# Patient Record
Sex: FEMALE | Race: WHITE
[De-identification: names, ages, dates, MRNs, and addresses within clinical notes are randomized per-mention and may not be internally consistent; named-entity substitution may affect disease eponyms.]

---

## 2017-03-02 ENCOUNTER — HOSPITAL ENCOUNTER (OUTPATIENT)
Dept: HOSPITAL 75 - RAD | Age: 77
End: 2017-03-02
Attending: NURSE PRACTITIONER
Payer: MEDICARE

## 2017-03-02 DIAGNOSIS — R06.00: Primary | ICD-10-CM

## 2017-03-02 PROCEDURE — 71250 CT THORAX DX C-: CPT

## 2017-03-02 NOTE — DIAGNOSTIC IMAGING REPORT
PROCEDURE: CT chest without contrast.



TECHNIQUE: Multiple contiguous axial images were obtained

through the chest without the use of intravenous contrast.



INDICATION: Shortness of breath with history of pneumonia.



Exam compared 12/26/2016.



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

involves the inferomedial aspects of both right middle lobe and

lingular segment of the left upper lobe at the lung bases

anteriorly. This is a chronic finding. A few scattered foci of

airspace like density in the left upper lobe, right lower lobe

laterally and both posterior sulci present on the previous have

resolved in the interim. An acute infiltrate or adverse change

is not found. No lung mass. No evidence for thoracic adenopathy.

No effusion. No chest wall pathology. Visualized upper abdomen

unremarkable.



IMPRESSION: Chronic changes involve lingula and right middle

lobe stable, multifocal patchy infiltrates present on the

previous exam have resolved. No acute finding or adverse change.



Dictated by:



Dictated on workstation # [id number]

## 2017-03-02 NOTE — XMS REPORT
Continuity of Care Document

 Created on: 2016



BRAN MARTINEZ SR

External Reference #: [id number]

: 1940

Sex: Female



Demographics







 Address  [address] [place]  [address]

 

 Home Phone  [phone]

 

 Preferred Language  English

 

 Marital Status  Unknown

 

 Confucianist Affiliation  Unknown

 

 Race  Unknown

 

 Ethnic Group  Unknown





Author







 Author  [facility]

 

 Organization  [facility]

 

 Address  Unknown

 

 Phone  Unavailable







Support







 Name  Relationship  Address  Phone

 

 AARON, BAYLEE SIMMS  Caregiver  [facility]

 [address] [place], [place]  [address] [phone]

 

 SUGAR WISEMAN MD  Caregiver  [address] [facility] [address]

[place]  [address] [phone]

 

 COURTNEY MCARTHUR MD  Caregiver  [facility]

 [address] [facility] [address]

[place]  [address]  [phone]

 

 ANETTE RIVERA MD  Caregiver  [facility]

 [address] [place], [place]  [address] [phone]

 

 MAGALYS BELL  Next Of Kin  [address] [place]  [address]  [phone]







Care Team Providers







 Care Team Member Name  Role  Phone

 

 SUGAR WISEMAN MD  PCP  [phone]







Insurance Providers







 Payer Name  Policy Number  Subscriber Name  Relationship

 

 [employer]  [id number]  Bran Martinez  18 Self / Same As Patient







Advance Directives







 Directive  Response  Recorded Date/Time

 

 Advance Directives  Yes  16 5:35pm

 

 Health Care Power of   Y SR [facility]   5:35pm

 

 Organ Donor  Yes  16 5:35pm

 

 Resuscitation Status  Full Code  16 5:35pm







Chief Complaint and Reason for Visit







 Chief Complaint  GASTROENTERITIS,WEAKNESS

 

 Reason for Visit  Gastroenteritis

General weakness







Problems

Active Problems







 Medical Problem  Onset Date  Status

 

 Gastroenteritis  Unknown  Acute

 

 General weakness  Unknown  Acute







Medications

Current Home Medications







 Medication  Dose  Units  Route  Directions  Days/Qty  Instructions  Start Date

 

 Levothyroxine Sodium 50 Mcg  50  Mcg  Oral  Daily        12

 

 Mu-Vits-Min Th/Lycopene/Lutein 1 Each  1  Tab  Oral  Daily        12

 

 Omega-3 Fatty Acids/Fish Oil 1 Each  1,000  Mg  Oral  Daily        09/10/12

 

 Cetirizine Hcl 10 Mg  10  Mg  Oral  Daily as needed for Allergies        

 

 Vitamin B Complex 1 Each  1  Cap  Oral  Daily        16

 

 Ascorbate Calcium 500 Mg  500  Mg  Oral  Daily        16

 

 Propylene Glycol 10 Ml  1  Drop  Each Eye  Every 4HRS as needed for Dry Eyes  
      16

 

 Aspirin/Acetaminophen/Caffeine 1 Each  1  Tab  Oral  Twice A Day as needed for 
Pain        16

 

 Cholecalciferol (Vitamin D3) 1,000 Unit  1,000  Unit  Oral  Daily        

 

 Calcium Carbonate/Vitamin D3 1 Each  1  Tab  Oral  Daily        16

 

 Fluticasone Propionate 16 Gm  2  Sprays  Nasal  Daily        16

 

 Cefdinir (Omnicef) 300 Mg  300  Mg  Oral  Twice A Day  10     16

 

 Omeprazole 20 Mg  20  Mg  Oral  Daily  30     16

 

 Azithromycin 500 Mg  500  Mg  Oral  Daily  5     16

 

 Ondansetron 8 Mg  8  Mg  Oral  Every 6 Hours as needed for Nausea  20     





Past Home Medications







 Medication  Directions  Ordered  Status

 

 Albuterol 8.5 Gm Hfa.aer.ad, 8.5 Gm Inhalation  Every 4HRS as needed  12
  Discontinued

 

 Ascorbic Acid 500 Mg Tablet, 500 Mg Oral  Daily  12  Discontinued

 

 [Allergy Shots] ,     12  Discontinued

 

 Doxycycline Hyclate (Vibramycin) 100 Mg Capsule, 1 Each Oral  Twice A Day    Discontinued

 

 Prednisone 50 Mg Tab, 50 Mg Oral  Daily  12  Discontinued

 

 [Allergenie Injection] , 2 Unit Subcutaneously  2SHOTS Every 2 Weeks  09/10/12
  Discontinued

 

 [Vitamin C & D3] 1000 Mg , 1000 Mg Oral  Daily  09/10/12  Discontinued

 

 Calcium Carbonate/Vitamin D3 1 Each Tablet, 500 Mg Oral  Daily  09/10/12  
Discontinued

 

 Tetrahydrozoline Hcl 15 Ml Drops, 1 Drop Ophthalmic  Daily  09/10/12  
Discontinued

 

 Aspirin 81 Mg Tabec, 81 Mg Oral  Daily as needed for Prn  12  
Discontinued

 

 Pantoprazole Sod 40 Mg Tab, 40 Mg Oral  Daily  12  Discontinued

 

 Tiotropium Bromide 18 Mcg Cap.w.dev, 1 Inhaler Inhalation  Daily  12  
Discontinued

 

 Ibuprofen 200 Mg Tablet, 200 Mg Oral  Daily as needed for Pain  16  
Discontinued







Social History







 Social History Problem  Response  Recorded Date/Time

 

 Alcohol Use  Denies Use  2016 5:35pm

 

 Recreational Drug Use  No  2016 5:35pm

 

 Recent Foreign Travel  No  2016 5:35pm

 

 Recent Infectious Disease Exposure  No  2016 5:35pm

 

 Smoking Status  Never a Smoker  2016 5:35pm

 

 Recent Hopitalizations  Y PNEUMONIAS  2016 5:35pm









 Query  Response  Start Date  Stop Date

 

 Smoking Status  Never a Smoker      







Hospital Discharge Instructions

 

Patient Instructions

Physician Instructions

 

New, Converted or Re-Newed RX:  Transmitted to Pharmacy

Goal/Follow Up Appt:  

Dr White next week for release to work

Discharge Diet:  Soft Diet

 

 

Care Plan

Goal:: Dr White next week for release to work

 



Plan of Care







 Discharge Date  16 12:20pm

 

 Disposition  01 HOME, SELF-CARE

 

 Instructions/Education Provided  Nausea and Vomiting, Adult

 

 Forms Provided  [facility] [facility]

 

 Prescriptions  See Medication Section

 

 Referrals   (Unspecified) - 



Reason(s) for Referral:

*********FOLLOW-UP WITH DR. MICHAELS'S OFFICE ON  AT 1:30 P.M.

*********FOLLOW-UP WITH DR. WHITE'S OFFICE ON  AT 2:00 P.M.

MAKE SURE TO BRING INSURANCE CARDS AND PHOTO I.D.

 

 Care Plan and Goals  See Discharge Instructions Section







Functional Status







 Query  Response  Date Recorded

 

 Patient Orientation  Person

Place

Time

Situation

Eyes Open

  2016 7:31am

 

 Comprehension Ability  Understands Concepts

  2016 9:00am







Allergies, Adverse Reactions, Alerts

No known allergies.



Immunizations

No immunization records.



Vital Signs

Acute Vital Signs





 Vital  Response  Date/Time

 

 Temperature (Fahrenheit)  97.2 degrees F (97.6 - 99.5)  2016 11:56am

 

 Temperature (Calculated Celsius)  36.87069 degrees C (36.4 - 37.5)  2016 
8:00am

 

 Temperature Source  Tympanic  2016 11:56am

 

 Pulse Rate (adult)  78 bpm (60 - 90)  2016 11:56am

 

 Respiratory Rate  20 bpm (12 - 24)  2016 11:56am

 

 O2 Sat by Pulse Oximetry  93 % (88 - 100)  2016 11:56am

 

 Blood Pressure  117/76 mm Hg  2016 11:56am

 

    Blood Pressure Mean  90 mm Hg  2016 8:00am

 

 Pain      

 

    Numeric Pain Scale  0-No Pain  2016 11:56am

 

 Height (Feet)  5 feet  2016 5:44pm

 

 Height (Inches)  3.00 inches  2016 5:44pm

 

 Height (Calculated Centimeters)  160.783133 cm  2016 5:44pm

 

 Weight (Pounds)  130 pounds  2016 5:44pm

 

 Weight (Ounces)  4.0 oz  2016 5:44pm

 

 Weight (Calculated Grams)  78475.41 gm  2016 5:44pm

 

 Weight (Calculated Kilograms)  59.304782 kilograms  2016 5:44pm

 

 Calculated BMI  23.1  2016 5:44pm

 

 Capillary Refill      

 

    Capillary Refill  Less Than 3 Seconds  2016 9:00am







Results

Laboratory Results







 Test Name  Result  Units  Flags  Reference  Collection Date/Time  Result Date/
Time  Comments

 

 White Blood Count  6.2  10^3/uL     4.3-11.0  2016 5:00am  2016 6:
14am   

 

 Red Blood Count  4.39  10^6/uL     4.35-5.85  2016 5:00am  2016 6:
14am   

 

 Hemoglobin  14.1  G/DL     11.5-16.0  2016 5:00am  2016 6:14am   

 

 Hematocrit  42  %     35-52  2016 5:00am  2016 6:14am   

 

 Mean Corpuscular Volume  95  FL     80-99  2016 5:00am  2016 6:
14am   

 

 Mean Corpuscular Hemoglobin  32  PG     25-34  2016 5:00am  2016 6:
14am   

 

 Mean Corpuscular Hemoglobin Concent  34  G/DL     32-36  2016 5:00am   6:14am   

 

 Red Cell Distribution Width  12.7  %     10.0-14.5  2016 5:00am  2016 6:14am   

 

 Platelet Count  218  10^3/uL     130-400  2016 5:00am  2016 6:14am
   

 

 Mean Platelet Volume  11.1  FL  H  7.4-10.4  2016 5:00am  2016 6:
14am   

 

 Neutrophils (%) (Auto)  57  %     42-75  2016 5:00am  2016 6:14am 
  

 

 Lymphocytes (%) (Auto)  23  %     12-44  2016 5:00am  2016 6:14am 
  

 

 Monocytes (%) (Auto)  12  %     0-12  2016 5:00am  2016 6:14am   

 

 Eosinophils (%) (Auto)  7  %     0-10  2016 5:00am  2016 6:14am   

 

 Basophils (%) (Auto)  1  %     0-10  2016 5:00am  2016 6:14am   

 

 Neutrophils # (Auto)  3.5  X 10^3     1.8-7.8  2016 5:00am  2016 6:
14am   

 

 Lymphocytes # (Auto)  1.5  X 10^3     1.0-4.0  2016 5:00am  2016 6:
14am   

 

 Monocytes # (Auto)  0.7  X 10^3     0.0-1.0  2016 5:00am  2016 6:
14am   

 

 Eosinophils # (Auto)  0.4  10^3/uL  H  0.0-0.3  2016 5:00am  2016 6
:14am   

 

 Basophils # (Auto)  0.0  10^3/uL     0.0-0.1  2016 5:00am  2016 6:
14am   

 

 Neutrophils % (Manual)  85  %        2016 2:05pm  2016 2:49pm   

 

 Band Neutrophils  4  %        2016 2:05pm  2016 2:49pm   

 

 Lymphocytes % (Manual)  10  %        2016 2:05pm  2016 2:49pm   

 

 Eosinophils % (Manual)  1  %        2016 2:05pm  2016 2:49pm   

 

 Blood Morphology Comment  NORMAL           2016 2:05pm  2016 2:
49pm   

 

 Urine Color  YELLOW           2016 8:15am  2016 8:36am   

 

 Urine Clarity  CLEAR           2016 8:15am  2016 8:36am   

 

 Urine pH  6.5        5-9  2016 8:15am  2016 8:36am   

 

 Urine Specific Gravity  1.005     *  1.016-1.022  2016 8:15am  2016 8:36am   

 

 Urine Protein  NEGATIVE        NEGATIVE  2016 8:15am  2016 8:36am 
  

 

 Urine Glucose (UA)  NEGATIVE        NEGATIVE  2016 8:15am  2016 8:
36am   

 

 Urine RBC (Auto)  NEGATIVE        NEGATIVE  2016 8:15am  2016 8:
36am   

 

 Urine Ketones  NEGATIVE        NEGATIVE  2016 8:15am  2016 8:36am 
  

 

 Urine Nitrite  NEGATIVE        NEGATIVE  2016 8:15am  2016 8:36am 
  

 

 Urine Bilirubin  NEGATIVE        NEGATIVE  2016 8:15am  2016 8:
36am   

 

 Urine Urobilinogen  NORMAL  MG/DL     NORMAL  2016 8:15am  2016 8:
36am   

 

 Urine Leukocyte Esterase  NEGATIVE        NEGATIVE  2016 8:15am  2016 8:36am   

 

 Urine RBC  RARE  /HPF        2016 8:15am  2016 8:36am   

 

 Urine WBC  NONE  /HPF        2016 8:15am  2016 8:36am   

 

 Urine Bacteria  NEGATIVE  /HPF        2016 8:15am  2016 8:36am   

 

 Urine Squamous Epithelial Cells  NONE  /HPF        2016 8:15am  2016 8:36am   

 

 Urine Crystals  NONE  /LPF        2016 8:15am  2016 8:36am   

 

 Urine Casts  NONE  /LPF        2016 8:15am  2016 8:36am   

 

 Urine Mucus  NEGATIVE  /LPF        2016 8:15am  2016 8:36am   

 

 Urine Culture Indicated  NO           2016 8:15am  2016 8:36am   

 

 Sodium Level  136  MMOL/L     135-145  2016 5:00am  2016 7:05am   

 

 Potassium Level  4.4  MMOL/L     3.6-5.0  2016 5:00am  2016 7:05am
   

 

 Chloride Level  103  MMOL/L       2016 5:00am  2016 7:05am   

 

 Carbon Dioxide Level  24  MMOL/L     21-32  2016 5:00am  2016 7:
05am   

 

 Anion Gap  9  MMOL/L     5-14  2016 5:00am  2016 7:05am   

 

 Blood Urea Nitrogen  5  MG/DL  L  7-18  2016 5:00am  2016 7:05am   

 

 Creatinine  0.70  MG/DL     0.60-1.30  2016 5:00am  2016 7:05am   

 

 BUN/Creatinine Ratio  7           2016 5:00am  2016 7:05am   

 

 Estimat Glomerular Filtration Rate  > 60           2016 5:00am  2016 7:05am                    GFR INTERPRETIVE DATA  

  

         UNITS FOR ESTIMATED GFR (eGFR): mL/min/1.73 M2  

         REFERENCE RANGE FOR ESTIMATED GFR (eGFR)  

                                          eGFR  

         NORMAL eGFR                       >60  

         MODERATELY DECREASED eGFR          30-59  

         SEVERLY DECREASED eGFR             15-29  

         KIDNEY FAILURE                    <15 (OR DIALYSIS)  

 

 Glucose Level  88  MG/DL       2016 5:00am  2016 7:05am   

 

 Calcium Level  8.8  MG/DL     8.5-10.1  2016 5:00am  2016 7:05am   

 

 Total Bilirubin  0.3  MG/DL     0.1-1.0  2016 5:00am  2016 7:05am 
  

 

 Alkaline Phosphatase  69  U/L       2016 5:00am  2016 7:05am
   

 

 Aspartate Amino Transf (AST/SGOT)  28  U/L     5-34  2016 5:00am  2016 7:05am   

 

 Alanine Aminotransferase (ALT/SGPT)  21  U/L     0-55  2016 5:00am   7:05am   

 

 B-Type Natriuretic Peptide  55.6  PG/ML     <100.0  2016 9:40am  2016 10:18am   

 

 Total Protein  5.9  G/DL  L  6.4-8.2  2016 5:00am  2016 7:05am   

 

 Albumin  3.7  G/DL     3.2-4.5  2016 5:00am  2016 7:05am   

 

 Lactic Acid Level  1.1  MMOL/L     0.5-2.0  2016 2:05pm  2016 2:
34pm   





Microbiology Results







 Procedure  Source  Result  Collection Date/Time  Result Date/Time

 

 Blood Culture  Peripheral, Right Wrist  No growth  2016 2:05pm  2016 2:09pm

 

 Blood Culture  Peripheral, Lt Hand  No growth  2016 2:00pm  2016 2:
09pm







Procedures

No known history of procedures.



Encounters







 Encounter  Location  Arrival/Admit Date  Discharge/Depart Date  Attending 
Provider

 

 Discharged Inpatient  [facility]  16 5:35pm   12:20pm  ANETTE RIVERA MD











 Recent Diagnosis

 

 Gastroenteritis

 

 General weakness

## 2017-03-28 ENCOUNTER — HOSPITAL ENCOUNTER (OUTPATIENT)
Dept: HOSPITAL 75 - SLEEP | Age: 77
LOS: 1 days | Discharge: HOME | End: 2017-03-29
Attending: NURSE PRACTITIONER
Payer: MEDICARE

## 2017-03-28 DIAGNOSIS — G47.50: ICD-10-CM

## 2017-03-28 DIAGNOSIS — G47.10: Primary | ICD-10-CM

## 2017-03-28 PROCEDURE — 95810 POLYSOM 6/> YRS 4/> PARAM: CPT

## 2017-04-28 ENCOUNTER — HOSPITAL ENCOUNTER (OUTPATIENT)
Dept: HOSPITAL 75 - RT | Age: 77
End: 2017-04-28
Attending: NURSE PRACTITIONER
Payer: MEDICARE

## 2017-04-28 DIAGNOSIS — R06.00: ICD-10-CM

## 2017-04-28 DIAGNOSIS — J47.9: Primary | ICD-10-CM

## 2017-04-28 PROCEDURE — 94729 DIFFUSING CAPACITY: CPT

## 2017-04-28 PROCEDURE — 94640 AIRWAY INHALATION TREATMENT: CPT

## 2017-04-28 PROCEDURE — 94726 PLETHYSMOGRAPHY LUNG VOLUMES: CPT

## 2017-04-28 PROCEDURE — 94060 EVALUATION OF WHEEZING: CPT

## 2018-01-14 ENCOUNTER — HOSPITAL ENCOUNTER (EMERGENCY)
Dept: HOSPITAL 75 - ER | Age: 78
Discharge: HOME | End: 2018-01-14
Payer: MEDICARE

## 2018-01-14 VITALS — SYSTOLIC BLOOD PRESSURE: 156 MMHG | DIASTOLIC BLOOD PRESSURE: 80 MMHG

## 2018-01-14 VITALS — WEIGHT: 131 LBS | BODY MASS INDEX: 23.21 KG/M2 | HEIGHT: 63 IN

## 2018-01-14 VITALS — DIASTOLIC BLOOD PRESSURE: 94 MMHG | SYSTOLIC BLOOD PRESSURE: 163 MMHG

## 2018-01-14 DIAGNOSIS — E03.9: ICD-10-CM

## 2018-01-14 DIAGNOSIS — R55: Primary | ICD-10-CM

## 2018-01-14 DIAGNOSIS — Z90.49: ICD-10-CM

## 2018-01-14 DIAGNOSIS — R91.8: ICD-10-CM

## 2018-01-14 DIAGNOSIS — Z87.01: ICD-10-CM

## 2018-01-14 DIAGNOSIS — Z90.710: ICD-10-CM

## 2018-01-14 DIAGNOSIS — Z90.89: ICD-10-CM

## 2018-01-14 LAB
ALBUMIN SERPL-MCNC: 3.9 GM/DL (ref 3.2–4.5)
ALP SERPL-CCNC: 74 U/L (ref 40–136)
ALT SERPL-CCNC: 17 U/L (ref 0–55)
APTT BLD: 28 SEC (ref 24–35)
APTT PPP: YELLOW S
BACTERIA #/AREA URNS HPF: (no result) /HPF
BASOPHILS # BLD AUTO: 0.1 10^3/UL (ref 0–0.1)
BASOPHILS NFR BLD AUTO: 1 % (ref 0–10)
BILIRUB SERPL-MCNC: 0.3 MG/DL (ref 0.1–1)
BILIRUB UR QL STRIP: NEGATIVE
BUN/CREAT SERPL: 15
CALCIUM SERPL-MCNC: 9.4 MG/DL (ref 8.5–10.1)
CHLORIDE SERPL-SCNC: 106 MMOL/L (ref 98–107)
CO2 SERPL-SCNC: 25 MMOL/L (ref 21–32)
CREAT SERPL-MCNC: 0.79 MG/DL (ref 0.6–1.3)
EOSINOPHIL # BLD AUTO: 0.2 10^3/UL (ref 0–0.3)
EOSINOPHIL NFR BLD AUTO: 4 % (ref 0–10)
ERYTHROCYTE [DISTWIDTH] IN BLOOD BY AUTOMATED COUNT: 12.9 % (ref 10–14.5)
FIBRINOGEN PPP-MCNC: CLEAR MG/DL
GFR SERPLBLD BASED ON 1.73 SQ M-ARVRAT: > 60 ML/MIN
GLUCOSE SERPL-MCNC: 98 MG/DL (ref 70–105)
GLUCOSE UR STRIP-MCNC: NEGATIVE MG/DL
HCT VFR BLD CALC: 41 % (ref 35–52)
HGB BLD-MCNC: 14.1 G/DL (ref 11.5–16)
INR PPP: 1.1 (ref 0.8–1.4)
KETONES UR QL STRIP: NEGATIVE
LEUKOCYTE ESTERASE UR QL STRIP: NEGATIVE
LYMPHOCYTES # BLD AUTO: 1.8 X 10^3 (ref 1–4)
LYMPHOCYTES NFR BLD AUTO: 35 % (ref 12–44)
MAGNESIUM SERPL-MCNC: 2.3 MG/DL (ref 1.8–2.4)
MANUAL DIFFERENTIAL PERFORMED BLD QL: NO
MCH RBC QN AUTO: 32 PG (ref 25–34)
MCHC RBC AUTO-ENTMCNC: 34 G/DL (ref 32–36)
MCV RBC AUTO: 95 FL (ref 80–99)
MONOCYTES # BLD AUTO: 0.4 X 10^3 (ref 0–1)
MONOCYTES NFR BLD AUTO: 8 % (ref 0–12)
MYOGLOBIN SERPL-MCNC: 50.7 NG/ML (ref 10–92)
NEUTROPHILS # BLD AUTO: 2.6 X 10^3 (ref 1.8–7.8)
NEUTROPHILS NFR BLD AUTO: 51 % (ref 42–75)
NITRITE UR QL STRIP: NEGATIVE
PH UR STRIP: 7 [PH] (ref 5–9)
PLATELET # BLD: 265 10^3/UL (ref 130–400)
PMV BLD AUTO: 9.9 FL (ref 7.4–10.4)
POTASSIUM SERPL-SCNC: 4.4 MMOL/L (ref 3.6–5)
PROT SERPL-MCNC: 6.4 GM/DL (ref 6.4–8.2)
PROT UR QL STRIP: NEGATIVE
PROTHROMBIN TIME: 13.9 SEC (ref 12.2–14.7)
RBC # BLD AUTO: 4.35 10^6/UL (ref 4.35–5.85)
RBC #/AREA URNS HPF: (no result) /HPF
SODIUM SERPL-SCNC: 140 MMOL/L (ref 135–145)
SP GR UR STRIP: 1 (ref 1.02–1.02)
T4 FREE SERPL-MCNC: 1.09 NG/DL (ref 0.7–1.48)
UROBILINOGEN UR-MCNC: NORMAL MG/DL
WBC # BLD AUTO: 5.2 10^3/UL (ref 4.3–11)
WBC #/AREA URNS HPF: (no result) /HPF

## 2018-01-14 PROCEDURE — 36415 COLL VENOUS BLD VENIPUNCTURE: CPT

## 2018-01-14 PROCEDURE — 84443 ASSAY THYROID STIM HORMONE: CPT

## 2018-01-14 PROCEDURE — 85730 THROMBOPLASTIN TIME PARTIAL: CPT

## 2018-01-14 PROCEDURE — 93041 RHYTHM ECG TRACING: CPT

## 2018-01-14 PROCEDURE — 85610 PROTHROMBIN TIME: CPT

## 2018-01-14 PROCEDURE — 93005 ELECTROCARDIOGRAM TRACING: CPT

## 2018-01-14 PROCEDURE — 81000 URINALYSIS NONAUTO W/SCOPE: CPT

## 2018-01-14 PROCEDURE — 85025 COMPLETE CBC W/AUTO DIFF WBC: CPT

## 2018-01-14 PROCEDURE — 84439 ASSAY OF FREE THYROXINE: CPT

## 2018-01-14 PROCEDURE — 84484 ASSAY OF TROPONIN QUANT: CPT

## 2018-01-14 PROCEDURE — 80053 COMPREHEN METABOLIC PANEL: CPT

## 2018-01-14 PROCEDURE — 71045 X-RAY EXAM CHEST 1 VIEW: CPT

## 2018-01-14 PROCEDURE — 83874 ASSAY OF MYOGLOBIN: CPT

## 2018-01-14 PROCEDURE — 83735 ASSAY OF MAGNESIUM: CPT

## 2018-01-14 NOTE — XMS REPORT
[facility]

 Created on: 2017



Carie Martinez

External Reference #: [id number]

: 1940

Sex: Female



Demographics







 Address  [address] [place]  [address]

 

 Preferred Language  Unknown

 

 Marital Status  Unknown

 

 Mandaen Affiliation  Unknown

 

 Race  Unknown

 

 Ethnic Group  Unknown





Author







 Author  FESTUS MAN

 

 [facility]

 

 Address  [address] [place]  [address]



 

 Phone  [phone]







Care Team Providers







 Care Team Member Name  Role  Phone

 

 FESTUS MAN  Unavailable  [phone]







PROBLEMS







 Type  Condition  ICD9-CM Code  JMJ00-NT Code  Onset Dates  Condition Status  
SNOMED Code

 

 Problem  Chondromalacia  733.92        Active  88006721

 

 Problem  Acute upper respiratory infections of unspecified site  465.9        
Active  74645349

 

 Problem  Pneumonia, organism unspecified  486        Active  278790356

 

 Problem  ZOSTAVAX DX  V05.8        Active  45530369

 

 Problem  Need for prophylactic vaccination and inoculation, Influenza  V04.81 
       Active  359630951

 

 Problem  Cough  786.2        Active  20416612

 

 Problem  Allergic rhinitis, unspecified allergic rhinitis type     J30.9     
Active  89328202

 

 Problem  Urinary incontinence in female     R32     Active  691692516

 

 Problem  Allergic rhinitis     J30.9     Active  75029127

 

 Problem  Allergic rhinitis, cause unspecified  477.9        Active  83531521

 

 Problem  Hearing difficulty of left ear     H91.92     Active  025920602

 

 Problem  Memory loss     R41.3     Active  45712961







ALLERGIES

Unknown Allergies



SOCIAL HISTORY

No smoking Hx information available



PLAN OF CARE





VITAL SIGNS





MEDICATIONS

Unknown Medications



RESULTS

No Results



PROCEDURES







 Procedure  Date Ordered  Related Diagnosis  Body Site

 

 IMMUNOTHERAPY INJECTIONS  2017      







IMMUNIZATIONS

No Known Immunizations

## 2018-01-14 NOTE — XMS REPORT
[facility]

 Created on: 2017



Carie Martinez

External Reference #: [id number]

: 1940

Sex: Female



Demographics







 Address  [address] [place]  [address]

 

 Preferred Language  Unknown

 

 Marital Status  Unknown

 

 Hinduism Affiliation  Unknown

 

 Race  Unknown

 

 Ethnic Group  Unknown





Author







 Author  MARIAN BUENO

 

 [facility]

 

 Address  [address] [place]  [address]



 

 Phone  [phone]







Care Team Providers







 Care Team Member Name  Role  Phone

 

 MARIAN BUENO  Unavailable  [phone]







PROBLEMS







 Type  Condition  ICD9-CM Code  WZT43-ET Code  Onset Dates  Condition Status  
SNOMED Code

 

 Problem  Acquired hypothyroidism     E03.9     Active  637396068

 

 Problem  Allergic rhinitis, unspecified allergic rhinitis type     J30.9     
Active  96975709

 

 Problem  Urinary incontinence in female     R32     Active  295014054

 

 Problem  Hearing difficulty of left ear     H91.92     Active  128980654







ALLERGIES

No Information



SOCIAL HISTORY

Never Assessed



PLAN OF CARE





VITAL SIGNS





MEDICATIONS

No Known Medications



RESULTS

No Results



PROCEDURES







 Procedure  Date Ordered  Result  Body Site

 

 IMMUNOTHERAPY, 2 OR MORE INJECTIONS  2017  N/A   

 

 IMMUNOTHERAPY INJECTIONS  2017      







IMMUNIZATIONS

No Known Immunizations



MEDICAL (GENERAL) HISTORY







 Type  Description  Date

 

 Medical History  thyroid   

 

 Medical History  allergies   

 

 Medical History  hot flashes   

 

 Medical History  ruptured ovarian cyst   

 

 Surgical History  tonsillectomy   

 

 Surgical History  hysterectomy  

 

 Surgical History  ovarian cyst   

 

 Hospitalization History  surgeries   

 

 Hospitalization History  pneumonia and dehydration  2016

## 2018-01-14 NOTE — XMS REPORT
[facility]

 Created on: 2017



Carie Martinez

External Reference #: [id number]

: 1940

Sex: Female



Demographics







 Address  [address] [place]  [address]

 

 Preferred Language  Unknown

 

 Marital Status  Unknown

 

 Taoist Affiliation  Unknown

 

 Race  Unknown

 

 Ethnic Group  Unknown





Author







 Author  MARIAN BUENO

 

 [facility]

 

 Address  [address] [place]  [address]



 

 Phone  [phone]







Care Team Providers







 Care Team Member Name  Role  Phone

 

 MARIAN BUENO  Unavailable  [phone]







PROBLEMS







 Type  Condition  ICD9-CM Code  GCY95-YQ Code  Onset Dates  Condition Status  
SNOMED Code

 

 Problem  Allergic rhinitis     J30.9     Active  87177819

 

 Problem  Acquired hypothyroidism     E03.9     Active  771256206

 

 Problem  Hearing difficulty of left ear     H91.92     Active  115744804

 

 Problem  Allergic rhinitis, unspecified allergic rhinitis type     J30.9     
Active  12044916

 

 Problem  Urinary incontinence in female     R32     Active  547319830







ALLERGIES

No Information



SOCIAL HISTORY

Never Assessed



PLAN OF CARE





VITAL SIGNS





MEDICATIONS

Unknown Medications



RESULTS

No Results



PROCEDURES







 Procedure  Date Ordered  Result  Body Site

 

 IMMUNOTHERAPY INJECTIONS  May 15, 2017      







IMMUNIZATIONS

No Known Immunizations



MEDICAL (GENERAL) HISTORY







 Type  Description  Date

 

 Medical History  thyroid   

 

 Medical History  allergies   

 

 Medical History  hot flashes   

 

 Medical History  ruptured ovarian cyst   

 

 Surgical History  tonsillectomy   

 

 Surgical History  hysterectomy  

 

 Surgical History  ovarian cyst   

 

 Hospitalization History  surgeries   

 

 Hospitalization History  pneumonia and dehydration  2016

## 2018-01-14 NOTE — DIAGNOSTIC IMAGING REPORT
INDICATION: Cough and congestion.



COMPARISON: 12/25/16



FINDINGS: Single view of the chest demonstrates hilar infiltrates

with basilar atelectasis. There is no pneumothorax. The heart is

prominent without pulmonary edema.



IMPRESSION: Hilar infiltrate with bibasilar atelectasis. Followup

recommended.



Dictated by: 



  Dictated on workstation # [id number]

## 2018-01-14 NOTE — XMS REPORT
[facility]

 Created on: 2017



Carie Martinez

External Reference #: [id number]

: 1940

Sex: Female



Demographics







 Address  [address] [place]  [address]

 

 Preferred Language  Unknown

 

 Marital Status  Unknown

 

 Roman Catholic Affiliation  Unknown

 

 Race  Unknown

 

 Ethnic Group  Unknown





Author







 Author  FESTUS MAN

 

 [facility]

 

 Address  [address] [place]  [address]



 

 Phone  [phone]







Care Team Providers







 Care Team Member Name  Role  Phone

 

 FESTUS MAN  Unavailable  [phone]







PROBLEMS







 Type  Condition  ICD9-CM Code  PJW60-OV Code  Onset Dates  Condition Status  
SNOMED Code

 

 Problem  Chondromalacia  733.92        Active  98739858

 

 Problem  Acute upper respiratory infections of unspecified site  465.9        
Active  92328833

 

 Problem  Pneumonia, organism unspecified  486        Active  288133088

 

 Problem  ZOSTAVAX DX  V05.8        Active  07457583

 

 Problem  Need for prophylactic vaccination and inoculation, Influenza  V04.81 
       Active  186787247

 

 Problem  Cough  786.2        Active  83998222

 

 Problem  Allergic rhinitis, unspecified allergic rhinitis type     J30.9     
Active  18422084

 

 Problem  Urinary incontinence in female     R32     Active  430311258

 

 Problem  Allergic rhinitis     J30.9     Active  99929969

 

 Problem  Allergic rhinitis, cause unspecified  477.9        Active  68592712

 

 Problem  Hearing difficulty of left ear     H91.92     Active  911370053

 

 Problem  Memory loss     R41.3     Active  55389881







ALLERGIES

Unknown Allergies



SOCIAL HISTORY

No smoking Hx information available



PLAN OF CARE





VITAL SIGNS





MEDICATIONS

Unknown Medications



RESULTS

No Results



PROCEDURES







 Procedure  Date Ordered  Related Diagnosis  Body Site

 

 IMMUNOTHERAPY INJECTIONS  2017      







IMMUNIZATIONS

No Known Immunizations

## 2018-01-14 NOTE — XMS REPORT
[facility]

 Created on: 10/03/2017



Carie Martinez

External Reference #: [id number]

: 1940

Sex: Female



Demographics







 Address  [address] [place]  [address]

 

 Preferred Language  Unknown

 

 Marital Status  Unknown

 

 Quaker Affiliation  Unknown

 

 Race  Unknown

 

 Ethnic Group  Unknown





Author







 Author  MARIAN BUENO

 

 [facility]

 

 Address  [address] [place]  [address]



 

 Phone  [phone]







Care Team Providers







 Care Team Member Name  Role  Phone

 

 MARIAN BUENO  Unavailable  [phone]







PROBLEMS







 Type  Condition  ICD9-CM Code  PFW94-WC Code  Onset Dates  Condition Status  
SNOMED Code

 

 Problem  Acquired hypothyroidism     E03.9     Active  509457699

 

 Problem  Allergic rhinitis, unspecified allergic rhinitis type     J30.9     
Active  57125987

 

 Problem  Urinary incontinence in female     R32     Active  005981979

 

 Problem  Hearing difficulty of left ear     H91.92     Active  771697257







ALLERGIES

No Information



SOCIAL HISTORY

Never Assessed



PLAN OF CARE





VITAL SIGNS





MEDICATIONS

No Known Medications



RESULTS

No Results



PROCEDURES







 Procedure  Date Ordered  Result  Body Site

 

 IMMUNOTHERAPY, 2 OR MORE INJECTIONS  2017  N/A   

 

 IMMUNOTHERAPY INJECTIONS  2017      







IMMUNIZATIONS

No Known Immunizations



MEDICAL (GENERAL) HISTORY







 Type  Description  Date

 

 Medical History  thyroid   

 

 Medical History  allergies   

 

 Medical History  hot flashes   

 

 Medical History  ruptured ovarian cyst   

 

 Surgical History  tonsillectomy   

 

 Surgical History  hysterectomy  

 

 Surgical History  ovarian cyst   

 

 Hospitalization History  surgeries   

 

 Hospitalization History  pneumonia and dehydration  2016

## 2018-01-14 NOTE — XMS REPORT
[facility]

 Created on: 2017



Carie Martinez

External Reference #: [id number]

: 1940

Sex: Female



Demographics







 Address  [address] [place]  [address]

 

 Preferred Language  Unknown

 

 Marital Status  Unknown

 

 Latter day Affiliation  Unknown

 

 Race  Unknown

 

 Ethnic Group  Unknown





Author







 Author  GOLD MILLER

 

 [facility]

 

 Address  [address] [place]  [address]



 

 Phone  [phone]







Care Team Providers







 Care Team Member Name  Role  Phone

 

 GOLD MILLER  Unavailable  [phone]







PROBLEMS







 Type  Condition  ICD9-CM Code  SSC86-FK Code  Onset Dates  Condition Status  
SNOMED Code

 

 Problem  Acute upper respiratory infections of unspecified site  465.9        
Active  67417692

 

 Problem  Allergic rhinitis, cause unspecified  477.9        Active  22150728

 

 Problem  ZOSTAVAX DX  V05.8        Active  04041737

 

 Problem  Cough  786.2        Active  81567154

 

 Problem  Need for prophylactic vaccination and inoculation, Influenza  V04.81 
       Active  248671315

 

 Problem  Pneumonia, organism unspecified  486        Active  732687777

 

 Problem  Allergic rhinitis, unspecified allergic rhinitis type     J30.9     
Active  75740023

 

 Problem  Memory loss     R41.3     Active  95389309

 

 Problem  Allergic rhinitis     J30.9     Active  49090662

 

 Problem  Chondromalacia  733.92        Active  43334497

 

 Problem  Hearing difficulty of left ear     H91.92     Active  690432756

 

 Problem  Urinary incontinence in female     R32     Active  810933502







ALLERGIES

Unknown Allergies



SOCIAL HISTORY

No smoking Hx information available



PLAN OF CARE





VITAL SIGNS





MEDICATIONS

Unknown Medications



RESULTS

No Results



PROCEDURES







 Procedure  Date Ordered  Related Diagnosis  Body Site

 

 IMMUNOTHERAPY, 2 OR MORE INJECTIONS  2016  N/A   

 

 IMMUNOTHERAPY INJECTIONS  Dec 19, 2016      







IMMUNIZATIONS

No Known Immunizations

## 2018-01-14 NOTE — XMS REPORT
[facility]

 Created on: 2017



Carie Martinez

External Reference #: [id number]

: 1940

Sex: Female



Demographics







 Address  [address] [place]  [address]

 

 Preferred Language  Unknown

 

 Marital Status  Unknown

 

 Anabaptist Affiliation  Unknown

 

 Race  Unknown

 

 Ethnic Group  Unknown





Author







 Author  FESTUS MAN

 

 [facility]

 

 Address  [address] [place]  [address]



 

 Phone  [phone]







Care Team Providers







 Care Team Member Name  Role  Phone

 

 FESTUS MAN  Unavailable  [phone]







PROBLEMS







 Type  Condition  ICD9-CM Code  IGP00-VP Code  Onset Dates  Condition Status  
SNOMED Code

 

 Problem  Chondromalacia  733.92        Active  46822445

 

 Problem  Acute upper respiratory infections of unspecified site  465.9        
Active  57922593

 

 Problem  Pneumonia, organism unspecified  486        Active  938308837

 

 Problem  ZOSTAVAX DX  V05.8        Active  15684005

 

 Problem  Need for prophylactic vaccination and inoculation, Influenza  V04.81 
       Active  281201533

 

 Problem  Cough  786.2        Active  03617095

 

 Problem  Allergic rhinitis, unspecified allergic rhinitis type     J30.9     
Active  54915848

 

 Problem  Urinary incontinence in female     R32     Active  533607605

 

 Problem  Allergic rhinitis     J30.9     Active  27063297

 

 Problem  Allergic rhinitis, cause unspecified  477.9        Active  97188125

 

 Problem  Hearing difficulty of left ear     H91.92     Active  449638627

 

 Problem  Memory loss     R41.3     Active  10581172







ALLERGIES

Unknown Allergies



SOCIAL HISTORY

No smoking Hx information available



PLAN OF CARE





VITAL SIGNS





MEDICATIONS

Unknown Medications



RESULTS

No Results



PROCEDURES







 Procedure  Date Ordered  Related Diagnosis  Body Site

 

 IMMUNOTHERAPY, 2 OR MORE INJECTIONS  2017  N/A   

 

 IMMUNOTHERAPY INJECTIONS  2017      







IMMUNIZATIONS

No Known Immunizations

## 2018-01-14 NOTE — XMS REPORT
[facility]

 Created on: 2017



Carie Martinez

External Reference #: [id number]

: 1940

Sex: Female



Demographics







 Address  [address] [place]  [address]

 

 Preferred Language  Unknown

 

 Marital Status  Unknown

 

 Moravian Affiliation  Unknown

 

 Race  Unknown

 

 Ethnic Group  Unknown





Author







 Author  FESTUS MAN

 

 [facility]

 

 Address  [address] [place]  [address]



 

 Phone  [phone]







Care Team Providers







 Care Team Member Name  Role  Phone

 

 FESTUS MAN  Unavailable  [phone]







PROBLEMS







 Type  Condition  ICD9-CM Code  CDO60-YG Code  Onset Dates  Condition Status  
SNOMED Code

 

 Problem  Chondromalacia  733.92        Active  90806001

 

 Problem  Acute upper respiratory infections of unspecified site  465.9        
Active  98563720

 

 Problem  Pneumonia, organism unspecified  486        Active  209124256

 

 Problem  ZOSTAVAX DX  V05.8        Active  21756734

 

 Problem  Need for prophylactic vaccination and inoculation, Influenza  V04.81 
       Active  977872564

 

 Problem  Cough  786.2        Active  67123110

 

 Problem  Allergic rhinitis, unspecified allergic rhinitis type     J30.9     
Active  56646835

 

 Problem  Urinary incontinence in female     R32     Active  507676135

 

 Problem  Allergic rhinitis     J30.9     Active  47646355

 

 Problem  Allergic rhinitis, cause unspecified  477.9        Active  72755177

 

 Problem  Hearing difficulty of left ear     H91.92     Active  501476380

 

 Problem  Memory loss     R41.3     Active  84750859







ALLERGIES

Unknown Allergies



SOCIAL HISTORY

No smoking Hx information available



PLAN OF CARE





VITAL SIGNS





MEDICATIONS

Unknown Medications



RESULTS

No Results



PROCEDURES

No Known procedures



IMMUNIZATIONS

No Known Immunizations

## 2018-01-14 NOTE — XMS REPORT
[facility]

 Created on: 2017



Carie Martinez

External Reference #: [id number]

: 1940

Sex: Female



Demographics







 Address  [address] [place]  [address]

 

 Preferred Language  Unknown

 

 Marital Status  Unknown

 

 Roman Catholic Affiliation  Unknown

 

 Race  Unknown

 

 Ethnic Group  Unknown





Author







 Author  FESTUS MAN

 

 [facility]

 

 Address  [address] [place]  [address]



 

 Phone  [phone]







Care Team Providers







 Care Team Member Name  Role  Phone

 

 FESTUS MAN  Unavailable  [phone]







PROBLEMS







 Type  Condition  ICD9-CM Code  TSX58-VV Code  Onset Dates  Condition Status  
SNOMED Code

 

 Problem  Chondromalacia  733.92        Active  31119436

 

 Problem  Acute upper respiratory infections of unspecified site  465.9        
Active  29894245

 

 Problem  Pneumonia, organism unspecified  486        Active  925412301

 

 Problem  ZOSTAVAX DX  V05.8        Active  54284432

 

 Problem  Need for prophylactic vaccination and inoculation, Influenza  V04.81 
       Active  382720741

 

 Problem  Cough  786.2        Active  30350745

 

 Problem  Allergic rhinitis, unspecified allergic rhinitis type     J30.9     
Active  58790776

 

 Problem  Urinary incontinence in female     R32     Active  653744245

 

 Problem  Allergic rhinitis     J30.9     Active  55192538

 

 Problem  Allergic rhinitis, cause unspecified  477.9        Active  27318668

 

 Problem  Hearing difficulty of left ear     H91.92     Active  539502878

 

 Problem  Memory loss     R41.3     Active  03648223







ALLERGIES

Unknown Allergies



SOCIAL HISTORY

No smoking Hx information available



PLAN OF CARE





VITAL SIGNS





MEDICATIONS

Unknown Medications



RESULTS

No Results



PROCEDURES







 Procedure  Date Ordered  Related Diagnosis  Body Site

 

 IMMUNOTHERAPY INJECTIONS  2017      







IMMUNIZATIONS

No Known Immunizations

## 2018-01-14 NOTE — XMS REPORT
[facility]

 Created on: 2017



Carie Martinez

External Reference #: [id number]

: 1940

Sex: Female



Demographics







 Address  [address] [place]  [address]

 

 Preferred Language  Unknown

 

 Marital Status  Unknown

 

 Yazidi Affiliation  Unknown

 

 Race  Unknown

 

 Ethnic Group  Unknown





Author







 Author  GOLD MILLER

 

 [facility]

 

 Address  [address] [place]  [address]



 

 Phone  [phone]







Care Team Providers







 Care Team Member Name  Role  Phone

 

 GOLD MILLER  Unavailable  [phone]







PROBLEMS







 Type  Condition  ICD9-CM Code  QZB10-JJ Code  Onset Dates  Condition Status  
SNOMED Code

 

 Assessment  Allergic rhinitis     J30.9  12 Dec, 2016  Active  25613190

 

 Problem  Need for prophylactic vaccination and inoculation, Influenza  V04.81 
       Active  038628662

 

 Problem  Cough  786.2        Active  70608644

 

 Problem  Allergic rhinitis     J30.9     Active  52904323

 

 Problem  Chondromalacia  733.92        Active  63348029

 

 Problem  Acute upper respiratory infections of unspecified site  465.9        
Active  54556067

 

 Problem  Pneumonia, organism unspecified  486        Active  683737577

 

 Problem  Allergic rhinitis, cause unspecified  477.9        Active  20238378

 

 Problem  ZOSTAVAX DX  V05.8        Active  63233296







ALLERGIES

Unknown Allergies



SOCIAL HISTORY

No smoking Hx information available



PLAN OF CARE





VITAL SIGNS





MEDICATIONS

Unknown Medications



RESULTS

No Results



PROCEDURES







 Procedure  Date Ordered  Related Diagnosis  Body Site

 

 IMMUNOTHERAPY, 2 OR MORE INJECTIONS  2016  N/A   

 

 IMMUNOTHERAPY INJECTIONS  Dec 12, 2016      







IMMUNIZATIONS

No Known Immunizations

## 2018-01-14 NOTE — XMS REPORT
[facility]

 Created on: 10/06/2017



Carie Martinez

External Reference #: [id number]

: 1940

Sex: Female



Demographics







 Address  [address] [place]  [address]

 

 Preferred Language  Unknown

 

 Marital Status  Unknown

 

 Denominational Affiliation  Unknown

 

 Race  Unknown

 

 Ethnic Group  Unknown





Author







 Author  MARIAN BUENO

 

 [facility]

 

 Address  [address] [place]  [address]



 

 Phone  [phone]







Care Team Providers







 Care Team Member Name  Role  Phone

 

 MARIAN BUENO  Unavailable  [phone]







PROBLEMS







 Type  Condition  ICD9-CM Code  FVY57-YJ Code  Onset Dates  Condition Status  
SNOMED Code

 

 Problem  Acquired hypothyroidism     E03.9     Active  883822318

 

 Problem  Allergic rhinitis, unspecified allergic rhinitis type     J30.9     
Active  26063653

 

 Problem  Urinary incontinence in female     R32     Active  750951850

 

 Problem  Hearing difficulty of left ear     H91.92     Active  112274477







ALLERGIES

No Information



SOCIAL HISTORY

Never Assessed



PLAN OF CARE





VITAL SIGNS





MEDICATIONS

No Known Medications



RESULTS

No Results



PROCEDURES







 Procedure  Date Ordered  Result  Body Site

 

 IMMUNOTHERAPY, 2 OR MORE INJECTIONS  2017  N/A   

 

 IMMUNOTHERAPY INJECTIONS  2017      







IMMUNIZATIONS

No Known Immunizations



MEDICAL (GENERAL) HISTORY







 Type  Description  Date

 

 Medical History  thyroid   

 

 Medical History  allergies   

 

 Medical History  hot flashes   

 

 Medical History  ruptured ovarian cyst   

 

 Surgical History  tonsillectomy   

 

 Surgical History  hysterectomy  

 

 Surgical History  ovarian cyst   

 

 Hospitalization History  surgeries   

 

 Hospitalization History  pneumonia and dehydration  2016

## 2018-01-14 NOTE — XMS REPORT
[facility]

 Created on: 2017



Carie Martinez

External Reference #: [id number]

: 1940

Sex: Female



Demographics







 Address  [address] [place]  [address]

 

 Preferred Language  Unknown

 

 Marital Status  Unknown

 

 Synagogue Affiliation  Unknown

 

 Race  Unknown

 

 Ethnic Group  Unknown





Author







 Author  CLARITZA LIN

 

 Organization  [facility]

 

 Address  [address] [place]  [address]



 

 Phone  [phone]







Care Team Providers







 Care Team Member Name  Role  Phone

 

 CLARITZA LIN  Unavailable  [phone]







PROBLEMS







 Type  Condition  ICD9-CM Code  EKT00-XW Code  Onset Dates  Condition Status  
SNOMED Code

 

 Problem  Chondromalacia  733.92        Active  38516385

 

 Problem  Acute upper respiratory infections of unspecified site  465.9        
Active  27633544

 

 Problem  Pneumonia, organism unspecified  486        Active  050551477

 

 Problem  ZOSTAVAX DX  V05.8        Active  32351705

 

 Problem  Need for prophylactic vaccination and inoculation, Influenza  V04.81 
       Active  053203709

 

 Problem  Cough  786.2        Active  88673785

 

 Problem  Allergic rhinitis, unspecified allergic rhinitis type     J30.9     
Active  95267463

 

 Problem  Urinary incontinence in female     R32     Active  878473368

 

 Problem  Allergic rhinitis     J30.9     Active  47873064

 

 Problem  Allergic rhinitis, cause unspecified  477.9        Active  19769873

 

 Problem  Hearing difficulty of left ear     H91.92     Active  604843041

 

 Problem  Memory loss     R41.3     Active  42237757







ALLERGIES







 Substance  Reaction  Event Type  Date  Status

 

 cat/dog dander/tree/dust  Unknown  Non Drug Allergy    Active







SOCIAL HISTORY

No smoking Hx information available



PLAN OF CARE







 Activity  Details









  









 Follow Up  prn Reason:







VITAL SIGNS







 Height  63 in  2017

 

 Weight  128.0 lbs  2017

 

 Temperature  97.9 degrees Fahrenheit  2017

 

 Heart Rate  78 bpm  2017

 

 Respiratory Rate  20   2017

 

 Oximetry  97 %  2017

 

 BMI  22.67 kg/m2  2017

 

 Blood pressure systolic  124 mmHg  2017

 

 Blood pressure diastolic  78 mmHg  2017







MEDICATIONS







 Medication  Instructions  Dosage  Frequency  Start Date  End Date  Duration  
Status

 

 Meloxicam 7.5 MG  Orally Once a day  1 tablet  24h  
  5 days  Active

 

 PredniSONE 20 MG  Orally Once a day  2 tablet  24h  
  5 days  Active

 

 levothyroxine 25 mcg     take 1 tablet (25 mcg) by oral route once daily     
21 Aug, 2012        Active

 

 Fish Oil 1000 MG  Orally Once a day  1 capsule  24h           Active

 

 Centrum Silver           21 Aug, 2012        Active

 

 Flonase Allergy Relief 50 MCG/ACT  Nasally Once a day  1 spray in each nostril
  24h           Active

 

 Tessalon Perles 100 mg     1 capsule by Oral route 3 times per day PRN     10 
Mar, 2014        Active

 

 Omeprazole 20 MG  Orally Once a day  2 capsules  24h           Active

 

 Vitamin B Complex                    Active

 

 Vitamin C 500 MG                    Active







RESULTS







 Name  Result  Date  Reference Range

 

 Xray : Chest (IN HOUSE)     2017   







PROCEDURES







 Procedure  Date Ordered  Related Diagnosis  Body Site

 

 MEASURE BLOOD OXYGEN LEVEL  2017      

 

 CHEST X-RAY  2017      

 

 Office Visit, Est Pt., Level 3  2017      

 

 [facility] VISIT ESTABLISHED PATIENT  2017      







IMMUNIZATIONS

No Known Immunizations

## 2018-01-14 NOTE — XMS REPORT
[facility]

 Created on: 2015



Carie Martinez

External Reference #: [id number]

: 1940

Sex: Female



Demographics







 Address  [address] [place]  [address]

 

 Home Phone  [phone]

 

 Preferred Language  Unknown

 

 Marital Status  Unknown

 

 Protestant Affiliation  Unknown

 

 Race  White

 

 Ethnic Group  Not  or 





Author







 Author  TRACEY SANFORD

 

 [facility]  [employer]

 

 Address  Unknown

 

 Phone  Unavailable







Care Team Providers







 Care Team Member Name  Role  Phone

 

 TRACEY SANFORD  CP  Unavailable



                                                                



Allergies, Adverse Reactions, Alerts

          





 Substance  Reaction  Event Type

 

 N.K.D.A.  Info Not Available  Non Drug Allergy



                                                                               
         



Problems

          





 Problem Type  Condition  Code  Onset Dates  Condition Status

 

 Assessment  Allergic rhinitis, unspecified allergic rhinitis type  J30.9     
Active

 

 Assessment  Encounter for immunization  Z23     Active

 

 Problem  Allergic rhinitis, cause unspecified  477.9     Active

 

 Problem  ZOSTAVAX DX  V05.8     Active

 

 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

 

 Problem  Need for prophylactic vaccination and inoculation, Influenza  V04.81 
    Active

 

 Problem  Cough  786.2     Active

 

 Problem  Acute upper respiratory infections of unspecified site  465.9     
Active

 

 Problem  Pneumonia, organism unspecified  486     Active



                                                                               
                                                                               
          



Medications

          





 Medication  Code System  Code  Instructions  Start Date  End Date  Status  
Dosage

 

 Centrum Silver  NDC  [address]-55      Aug 21, 2012        not defined

 

 Fish Oil  NDC  [address]-33  1000 MG Orally Once a day           1 capsule

 

 levothyroxine  NDC  0  25 mcg    Aug 21, 2012        take 1 tablet (25 mcg) by 
oral route once daily



                                                                               
                             



Procedures

          





 Procedure  Coding System  Code  Date

 

 Office Visit, Est Pt., Level 3  CPT-4  88881  2015

 

 PCV 13  CPT-4  21363  2015

 

 [facility] VISIT ESTABLISHED PATIENT  CPT-4    2015

 

 SINGLE IMMUNIZATION ADMIN  CPT-4  57117  2015



                                                                               
                                                 



Vital Signs

          





 Date/Time:  2015

 

 Temperature  96.1 F

 

 Weight  133.9 lbs

 

 Height  63 in

 

 BMI  23.72 Index

 

 Blood Pressure Diastolic  80 mmHg

 

 Blood Pressure Systolic  120 mmHg

 

 Cardiac Monitoring Heart Rate  88 bpm



                                                                              



Results

          No Known Results                                                     
                                   



Immunizations

          





 Vaccine  Administration Date

 

 PCV 13  2015



                                                                    



Summary Purpose

          eClinicalWorks Submission

## 2018-01-14 NOTE — XMS REPORT
[facility]

 Created on: 10/11/2016



Carie Martinez

External Reference #: [id number]

: 1940

Sex: Female



Demographics







 Address  [address] [place]  [address]

 

 Home Phone  [phone]

 

 Preferred Language  Unknown

 

 Marital Status  Unknown

 

 Mormonism Affiliation  Unknown

 

 Race  White

 

 Ethnic Group  Not  or 





Author







 Author  NORBERTO ANNA

 

 [facility]  [employer]

 

 Address  Unknown

 

 Phone  Unavailable







Care Team Providers







 Care Team Member Name  Role  Phone

 

 NORBERTO ANNA  CP  Unavailable



                                                                



Allergies, Adverse Reactions, Alerts

          





 Substance  Reaction  Event Type

 

 N.K.D.A.  Info Not Available  Non Drug Allergy



                                                                               
         



Problems

          





 Problem Type  Condition  Code  Onset Dates  Condition Status

 

 Assessment  Upper respiratory tract infection, unspecified type  J06.9     
Active

 

 Assessment  Bilateral otitis media with effusion  H65.93     Active

 

 Problem  Allergic rhinitis, cause unspecified  477.9     Active

 

 Problem  ZOSTAVAX DX  V05.8     Active

 

 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

 

 Problem  Need for prophylactic vaccination and inoculation, Influenza  V04.81 
    Active

 

 Problem  Cough  786.2     Active

 

 Problem  Acute upper respiratory infections of unspecified site  465.9     
Active

 

 Problem  Pneumonia, organism unspecified  486     Active



                                                                               
                                                                               
          



Medications

          





 Medication  Code System  Code  Instructions  Start Date  End Date  Status  
Dosage

 

 Vitamin B Complex  NDC  [address]959-[address]   Orally            not defined

 

 Fish Oil  NDC  [address]-00  1000 MG Orally Once a day           1 capsule

 

 levothyroxine  NDC  0  25 mcg    Aug 21, 2012        take 1 tablet (25 mcg) by 
oral route once daily

 

 Vitamin C  NDC  50903-224[address]  500 MG Orally            not defined

 

 PredniSONE  NDC  [address]-67  20 MG Orally Once a day  Oct 07, 2016  Oct 12, 
2016     2 tablet

 

 Zyrtec Allergy  NDC  [address]-79  10 MG Orally Once a day           1 tablet 
as needed

 

 Centrum Silver  NDC  [address]-79      Aug 21, 2012        not defined

 

 Azithromycin  NDC  [address]-71  250 MG Orally Once a day  Oct 07, 2016  Oct 
12, 2016     2 tablets  on the first day, then 1 tablet daily for 4 days



                                                                               
                                                                               



Procedures

          





 Procedure  Coding System  Code  Date

 

 Office Visit, Est Pt., Level 3  CPT-4  43906  Oct 07, 2016

 

 [facility] VISIT ESTABLISHED PATIENT  CPT-4    Oct 07, 2016



                                                                               
                             



Vital Signs

          





 Date/Time:  Oct 07, 2016

 

 Cardiac Monitoring Heart Rate  80 bpm

 

 Weight  129.2 lbs

 

 Height  63 in

 

 BMI  22.88 Index

 

 Blood Pressure Diastolic  74 mmHg

 

 Blood Pressure Systolic  128 mmHg



                                                                              



Results

          No Known Results                                                     
               



Summary Purpose

          eClinicalWorks Submission

## 2018-01-14 NOTE — XMS REPORT
[facility]

 Created on: 2017



Carie Martinez

External Reference #: [id number]

: 1940

Sex: Female



Demographics







 Address  [address] [place]  [address]

 

 Preferred Language  Unknown

 

 Marital Status  Unknown

 

 Episcopalian Affiliation  Unknown

 

 Race  Unknown

 

 Ethnic Group  Unknown





Author







 Author  MARIAN BUENO

 

 [facility]

 

 Address  [address] [place]  [address]



 

 Phone  [phone]







Care Team Providers







 Care Team Member Name  Role  Phone

 

 MARIAN BUENO  Unavailable  [phone]







PROBLEMS







 Type  Condition  ICD9-CM Code  VNW23-YY Code  Onset Dates  Condition Status  
SNOMED Code

 

 Problem  Allergic rhinitis     J30.9     Active  00431624

 

 Problem  Acquired hypothyroidism     E03.9     Active  412049394

 

 Problem  Hearing difficulty of left ear     H91.92     Active  900789779

 

 Problem  Allergic rhinitis, unspecified allergic rhinitis type     J30.9     
Active  91589814

 

 Problem  Urinary incontinence in female     R32     Active  000609228







ALLERGIES

No Information



SOCIAL HISTORY

Never Assessed



PLAN OF CARE





VITAL SIGNS





MEDICATIONS

Unknown Medications



RESULTS

No Results



PROCEDURES







 Procedure  Date Ordered  Result  Body Site

 

 IMMUNOTHERAPY INJECTIONS  2017      







IMMUNIZATIONS

No Known Immunizations



MEDICAL (GENERAL) HISTORY







 Type  Description  Date

 

 Medical History  thyroid   

 

 Medical History  allergies   

 

 Medical History  hot flashes   

 

 Medical History  ruptured ovarian cyst   

 

 Surgical History  tonsillectomy   

 

 Surgical History  hysterectomy  

 

 Surgical History  ovarian cyst   

 

 Hospitalization History  surgeries   

 

 Hospitalization History  pneumonia and dehydration  2016

## 2018-01-14 NOTE — XMS REPORT
[facility]

 Created on: 10/26/2015



Carie Martinez

External Reference #: [id number]

: 1940

Sex: Female



Demographics







 Address  [address] [address]

 

 Home Phone  [phone]

 

 Preferred Language  Unknown

 

 Marital Status  Unknown

 

 Gnosticist Affiliation  Unknown

 

 Race  White

 

 Ethnic Group  Not  or 





Author







 FESTUS Masterson  [employer]

 

 Address  Unknown

 

 Phone  Unavailable







Care Team Providers







 Care Team Member Name  Role  Phone

 

 FESTUS MAN  CP  Unavailable



                                                                



Allergies

          No Known Allergies                                                   
                                     



Problems

          





 Problem Type  Condition  Code  Onset Dates  Condition Status

 

 Assessment  Encounter for immunization  Z23     Active

 

 Problem  Allergic rhinitis, cause unspecified  477.9     Active

 

 Problem  ZOSTAVAX DX  V05.8     Active

 

 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

 

 Problem  Need for prophylactic vaccination and inoculation, Influenza  V04.81 
    Active

 

 Problem  Cough  786.2     Active

 

 Problem  Acute upper respiratory infections of unspecified site  465.9     
Active

 

 Problem  Pneumonia, organism unspecified  486     Active



                                                                               
                                                                               



Medications

          No Known Medications                                                 
                                       



Procedures

          





 Procedure  Coding System  Code  Date

 

 SINGLE IMMUNIZATION ADMIN  CPT-4  35209  Oct 26, 2015

 

 FLUARIX QUAD (3 & UP)-GSK-  CPT-4  73058  Oct 26, 2015



                                                                               
         



Results

          No Known Results                                                     
                                   



Immunizations

          





 Vaccine  Administration Date

 

 FLUARIX QUAD (3 & UP)-GSK-2015  Oct 26, 2015



                                                                    



Summary Purpose

          eClinicalWorks Submission

## 2018-01-14 NOTE — XMS REPORT
[facility]

 Created on: 2017



Carie Martinez

External Reference #: [id number]

: 1940

Sex: Female



Demographics







 Address  [address] [place]  [address]

 

 Preferred Language  Unknown

 

 Marital Status  Unknown

 

 Uatsdin Affiliation  Unknown

 

 Race  Unknown

 

 Ethnic Group  Unknown





Author







 Author  MARIAN BUENO

 

 [facility]

 

 Address  [address] [place]  [address]



 

 Phone  [phone]







Care Team Providers







 Care Team Member Name  Role  Phone

 

 MARIAN BUENO  Unavailable  [phone]







PROBLEMS







 Type  Condition  ICD9-CM Code  RPG49-OH Code  Onset Dates  Condition Status  
SNOMED Code

 

 Problem  Acute upper respiratory infections of unspecified site  465.9        
Active  08605449

 

 Problem  Allergic rhinitis, cause unspecified  477.9        Active  56539625

 

 Problem  ZOSTAVAX DX  V05.8        Active  74528059

 

 Problem  Cough  786.2        Active  25900841

 

 Problem  Need for prophylactic vaccination and inoculation, Influenza  V04.81 
       Active  070117193

 

 Problem  Pneumonia, organism unspecified  486        Active  231842808

 

 Problem  Allergic rhinitis, unspecified allergic rhinitis type     J30.9     
Active  42989869

 

 Problem  Memory loss     R41.3     Active  38284145

 

 Problem  Allergic rhinitis     J30.9     Active  75132822

 

 Problem  Chondromalacia  733.92        Active  94035916

 

 Problem  Hearing difficulty of left ear     H91.92     Active  646068453

 

 Problem  Urinary incontinence in female     R32     Active  087043650







ALLERGIES







 Substance  Reaction  Event Type  Date  Status

 

 cat/dog dander/tree/dust  Unknown  Non Drug Allergy  22 Dec, 2016  Active







SOCIAL HISTORY

No smoking Hx information available



PLAN OF CARE







 Activity  Details









  









 Follow Up  annually for preventive care, sooner for chronic health maintenance 
Reason:







VITAL SIGNS







 Height  63 in  2016

 

 Weight  127.8 lbs  2016

 

 Temperature  98.4 degrees Fahrenheit  2016

 

 Heart Rate  80 bpm  2016

 

 Respiratory Rate  20   2016

 

 BMI  22.64 kg/m2  2016

 

 Blood pressure systolic  120 mmHg  2016

 

 Blood pressure diastolic  78 mmHg  2016







MEDICATIONS







 Medication  Instructions  Dosage  Frequency  Start Date  End Date  Duration  
Status

 

 levothyroxine 25 mcg     take 1 tablet (25 mcg) by oral route once daily     
21 Aug, 2012        Active

 

 Centrum Silver           21 Aug, 2012        Active

 

 Vitamin B Complex                    Active

 

 Flonase Allergy Relief 50 MCG/ACT  Nasally Once a day  1 spray in each nostril
  24h           Active

 

 Fish Oil 1000 MG  Orally Once a day  1 capsule  24h           Active

 

 Tessalon Perles 100 mg     1 capsule by Oral route 3 times per day PRN     10 
Mar, 2014        Active

 

 Vitamin C 500 MG                    Active







RESULTS

No Results



PROCEDURES







 Procedure  Date Ordered  Related Diagnosis  Body Site

 

 INIT PREV PE LTD DUR 1ST 12 MOS MCR  Dec 22, 2016      

 

 ANNUAL WELLNES VST; PERSNL PPS INIT  Dec 22, 2016      

 

 Preventive Care Est Pt. Age 65 and over  Dec 22, 2016      

 

 ANNUAL WELLNESS VST; PPS SUBSQT VST  Dec 22, 2016      







IMMUNIZATIONS

No Known Immunizations

## 2018-01-14 NOTE — XMS REPORT
[facility]

 Created on: 2017



Carie Martinez

External Reference #: [id number]

: 1940

Sex: Female



Demographics







 Address  [address] [place]  [address]

 

 Preferred Language  Unknown

 

 Marital Status  Unknown

 

 Scientologist Affiliation  Unknown

 

 Race  Unknown

 

 Ethnic Group  Unknown





Author







 Author  FESTUS MAN

 

 [facility]

 

 Address  [address] [place]  [address]



 

 Phone  [phone]







Care Team Providers







 Care Team Member Name  Role  Phone

 

 FESTUS MAN  Unavailable  [phone]







PROBLEMS







 Type  Condition  ICD9-CM Code  SCL27-QY Code  Onset Dates  Condition Status  
SNOMED Code

 

 Problem  Acquired hypothyroidism     E03.9     Active  892217704

 

 Problem  Allergic rhinitis, unspecified allergic rhinitis type     J30.9     
Active  24738487

 

 Problem  Urinary incontinence in female     R32     Active  121306507

 

 Problem  Hearing difficulty of left ear     H91.92     Active  228688641







ALLERGIES

No Information



SOCIAL HISTORY

Never Assessed



PLAN OF CARE





VITAL SIGNS





MEDICATIONS

No Known Medications



RESULTS

No Results



PROCEDURES







 Procedure  Date Ordered  Result  Body Site

 

 IMMUNOTHERAPY, 2 OR MORE INJECTIONS  2017  N/A   

 

 IMMUNOTHERAPY INJECTIONS  2017      







IMMUNIZATIONS

No Known Immunizations



MEDICAL (GENERAL) HISTORY







 Type  Description  Date

 

 Medical History  thyroid   

 

 Medical History  allergies   

 

 Medical History  hot flashes   

 

 Medical History  ruptured ovarian cyst   

 

 Surgical History  tonsillectomy   

 

 Surgical History  hysterectomy  

 

 Surgical History  ovarian cyst   

 

 Hospitalization History  surgeries   

 

 Hospitalization History  pneumonia and dehydration  2016

## 2018-01-14 NOTE — XMS REPORT
Continuity of Care Document

 Created on: 2018



FELIPE SWANN, BRAN

External Reference #: [id number]

: 1940

Sex: Female



Demographics







 Address  [address] [place]  [address]

 

 Home Phone  [phone] x

 

 Preferred Language  Unknown

 

 Marital Status  Unknown

 

 Restoration Affiliation  Unknown

 

 Race  Unknown

 

 Ethnic Group  Unknown





Author







 Author  [facility]

 

 Organization  [facility]

 

 Address  Unknown

 

 Phone  Unavailable



              



Allergies

      





 Active            Description            Code            Type            
Severity            Reaction            Onset            Reported/Identified   
         Relationship to Patient            Clinical Status        

 

 Yes            No Known Drug Allergies            [id number]            Drug 
Allergy            Unknown            N/A                         2016   
                               



                  



Medications

      



There is no data.                  



Problems

      





 Date Dx Coded            Attending            Type            Code            
Diagnosis            Diagnosed By        

 

 02/10/2011                                      733.92            
CHONDROMALACIA                     

 

 02/10/2011                                      733.92            
CHONDROMALACIA                     

 

 02/10/2011                                      733.92            
CHONDROMALACIA                     

 

 02/10/2011                                      733.92            
CHONDROMALACIA                     

 

 02/10/2011                                      733.92            
CHONDROMALACIA                     

 

 02/10/2011                                      733.92            
CHONDROMALACIA                     

 

 02/10/2011                                      733.92            
CHONDROMALACIA                     

 

 02/10/2011            MAN DO, FESTUS K                         733.92         
   CHONDROMALACIA                     

 

 02/10/2011            MAN DO, FESTUS K                         733.92         
   CHONDROMALACIA                     

 

 02/10/2011            MAN DO, FESTUS K                         733.92         
   CHONDROMALACIA                     

 

 02/10/2011            MAN DO, FESTUS K                         733.92         
   CHONDROMALACIA                     

 

 02/10/2011            MAN DO, FESTUS K                         733.92         
   CHONDROMALACIA                     

 

 02/10/2011            MAN DO, FESTUS K                         733.92         
   CHONDROMALACIA                     

 

 02/10/2011            MAN DO, FESTUS K                         733.92         
   CHONDROMALACIA                     

 

 02/10/2011            MAN DO, FESTUS K                         733.92         
   CHONDROMALACIA                     

 

 02/10/2011            MAN DO, FESTUS K                         733.92         
   CHONDROMALACIA                     

 

 02/10/2011            MAN DO, FESTUS K                         733.92         
   CHONDROMALACIA                     

 

 02/10/2011            MAN DO, FESTUS K                         733.92         
   CHONDROMALACIA                     

 

 02/10/2011            MAN DO, FESTUS K                         733.92         
   CHONDROMALACIA                     

 

 02/10/2011            MAN DO, FESTUS K                         733.92         
   CHONDROMALACIA                     

 

 02/10/2011            MAN DO, FESTUS K                         733.92         
   CHONDROMALACIA                     

 

 02/10/2011            MAN DO, FESTUS K                         733.92         
   CHONDROMALACIA                     

 

 02/10/2011            MAN DO, FESTUS K                         733.92         
   CHONDROMALACIA                     

 

 02/10/2011            MAN DO, FESTUS K                         733.92         
   CHONDROMALACIA                     

 

 02/10/2011            MAN DO, FESTUS K                         733.92         
   CHONDROMALACIA                     

 

 02/10/2011            MAN DO, FESTUS K                         733.92         
   CHONDROMALACIA                     

 

 02/10/2011            MAN DO, FESTUS K                         733.92         
   CHONDROMALACIA                     

 

 02/10/2011            MAN DO, FESTUS K                         733.92         
   CHONDROMALACIA                     

 

 02/10/2011            MAN DO, FESTUS K                         733.92         
   CHONDROMALACIA                     

 

 02/10/2011            MAN DO, FESTUS K                         733.92         
   CHONDROMALACIA                     

 

 02/10/2011            MAN DO, FESTUS K                         733.92         
   CHONDROMALACIA                     

 

 02/10/2011            MAN DO, FESTUS K                         733.92         
   CHONDROMALACIA                     

 

 02/10/2011            MAN DO, FESTUS K                         733.92         
   CHONDROMALACIA                     

 

 02/10/2011            MAN DO, FESTUS K                         733.92         
   CHONDROMALACIA                     

 

 02/10/2011            MAN DO, FESTUS K                         733.92         
   CHONDROMALACIA                     

 

 02/10/2011            MAN DO, FESTUS K                         733.92         
   CHONDROMALACIA                     

 

 02/10/2011            MAN DO, FESTUS K                         733.92         
   CHONDROMALACIA                     

 

 02/10/2011            MAN DO, FESTUS K                         733.92         
   CHONDROMALACIA                     

 

 02/10/2011            MAN DO, FESTUS K                         733.92         
   CHONDROMALACIA                     

 

 02/10/2011            MAN DO, FESTUS K                         733.92         
   CHONDROMALACIA                     

 

 02/10/2011            MAN DO, FESTUS K                         733.92         
   CHONDROMALACIA                     

 

 02/10/2011            MAN DO, FESTUS K                         733.92         
   CHONDROMALACIA                     

 

 02/10/2011            MAN DO, FESTUS K                         733.92         
   CHONDROMALACIA                     

 

 02/10/2011            MAN DO, FESTUS K                         733.92         
   CHONDROMALACIA                     

 

 02/10/2011            MAN DO, FESTUS K                         733.92         
   CHONDROMALACIA                     

 

 02/10/2011            MAN DO, FESTUS K                         733.92         
   CHONDROMALACIA                     

 

 02/10/2011            MAN DO, FESTUS K                         733.92         
   CHONDROMALACIA                     

 

 02/10/2011            MAN DO, FESTUS K                         733.92         
   CHONDROMALACIA                     

 

 02/10/2011            MAN DO, FESTUS K                         733.92         
   CHONDROMALACIA                     

 

 02/10/2011            MAN DO, FESTUS K                         733.92         
   CHONDROMALACIA                     

 

 2011                                      995.3            ALLERGY 
UNSPECIFIED NOT ELSEWHERE CLASSIFIED                     

 

 2011                                      995.3            ALLERGY 
UNSPECIFIED NOT ELSEWHERE CLASSIFIED                     

 

 2011                                      995.3            ALLERGY 
UNSPECIFIED NOT ELSEWHERE CLASSIFIED                     

 

 2011                                      995.3            ALLERGY 
UNSPECIFIED NOT ELSEWHERE CLASSIFIED                     

 

 2011                                      995.3            ALLERGY 
UNSPECIFIED NOT ELSEWHERE CLASSIFIED                     

 

 2011                                      995.3            ALLERGY 
UNSPECIFIED NOT ELSEWHERE CLASSIFIED                     

 

 2011                                      995.3            ALLERGY 
UNSPECIFIED NOT ELSEWHERE CLASSIFIED                     

 

 2011            MAN DO, FESTUS K                         995.3          
  ALLERGY UNSPECIFIED NOT ELSEWHERE CLASSIFIED                     

 

 2011            MAN DO, FESTUS K                         995.3          
  ALLERGY UNSPECIFIED NOT ELSEWHERE CLASSIFIED                     

 

 2011            MAN DO, FESTUS K                         995.3          
  ALLERGY UNSPECIFIED NOT ELSEWHERE CLASSIFIED                     

 

 2011            MAN DO, FESTUS K                         995.3          
  ALLERGY UNSPECIFIED NOT ELSEWHERE CLASSIFIED                     

 

 2011            MAN DO, FESTUS K                         995.3          
  ALLERGY UNSPECIFIED NOT ELSEWHERE CLASSIFIED                     

 

 2011            MAN DO, FESTUS K                         995.3          
  ALLERGY UNSPECIFIED NOT ELSEWHERE CLASSIFIED                     

 

 2011            MAN DO, FESTUS K                         995.3          
  ALLERGY UNSPECIFIED NOT ELSEWHERE CLASSIFIED                     

 

 2011            MAN DO, FESTUS K                         995.3          
  ALLERGY UNSPECIFIED NOT ELSEWHERE CLASSIFIED                     

 

 2011            MAN DO, FESTUS K                         995.3          
  ALLERGY UNSPECIFIED NOT ELSEWHERE CLASSIFIED                     

 

 2011            MAN DO, FESTUS K                         995.3          
  ALLERGY UNSPECIFIED NOT ELSEWHERE CLASSIFIED                     

 

 2011            MAN DO, FESTUS K                         995.3          
  ALLERGY UNSPECIFIED NOT ELSEWHERE CLASSIFIED                     

 

 2011            MAN DO, FESTUS K                         995.3          
  ALLERGY UNSPECIFIED NOT ELSEWHERE CLASSIFIED                     

 

 2011            MAN DO, FESTUS K                         995.3          
  ALLERGY UNSPECIFIED NOT ELSEWHERE CLASSIFIED                     

 

 2011            MAN DO, FESTUS K                         995.3          
  ALLERGY UNSPECIFIED NOT ELSEWHERE CLASSIFIED                     

 

 2011            MAN DO, FESTUS K                         995.3          
  ALLERGY UNSPECIFIED NOT ELSEWHERE CLASSIFIED                     

 

 2011            MAN DO, FESTUS K                         995.3          
  ALLERGY UNSPECIFIED NOT ELSEWHERE CLASSIFIED                     

 

 2011            MAN DO, FESTUS K                         995.3          
  ALLERGY UNSPECIFIED NOT ELSEWHERE CLASSIFIED                     

 

 2011            MAN DO, FESTUS K                         995.3          
  ALLERGY UNSPECIFIED NOT ELSEWHERE CLASSIFIED                     

 

 2011            MAN DO, FESTUS K                         995.3          
  ALLERGY UNSPECIFIED NOT ELSEWHERE CLASSIFIED                     

 

 2011            MAN DO, FESTUS K                         995.3          
  ALLERGY UNSPECIFIED NOT ELSEWHERE CLASSIFIED                     

 

 2011            MAN DO, FESTUS K                         995.3          
  ALLERGY UNSPECIFIED NOT ELSEWHERE CLASSIFIED                     

 

 2011            MAN DO, FESTUS K                         995.3          
  ALLERGY UNSPECIFIED NOT ELSEWHERE CLASSIFIED                     

 

 2011            MAN DO, FESTUS K                         995.3          
  ALLERGY UNSPECIFIED NOT ELSEWHERE CLASSIFIED                     

 

 2011            MAN DO, FESTUS K                         995.3          
  ALLERGY UNSPECIFIED NOT ELSEWHERE CLASSIFIED                     

 

 2011            MAN DO, FESTUS K                         995.3          
  ALLERGY UNSPECIFIED NOT ELSEWHERE CLASSIFIED                     

 

 2011            MAN DO, FESTUS K                         995.3          
  ALLERGY UNSPECIFIED NOT ELSEWHERE CLASSIFIED                     

 

 2011            MAN DO, FESTUS K                         995.3          
  ALLERGY UNSPECIFIED NOT ELSEWHERE CLASSIFIED                     

 

 2011            MAN DO, FESTUS K                         995.3          
  ALLERGY UNSPECIFIED NOT ELSEWHERE CLASSIFIED                     

 

 2011            MAN DO, FESTUS K                         995.3          
  ALLERGY UNSPECIFIED NOT ELSEWHERE CLASSIFIED                     

 

 2011            MAN DO, FESTUS K                         995.3          
  ALLERGY UNSPECIFIED NOT ELSEWHERE CLASSIFIED                     

 

 2011            MAN DO, FESTUS K                         995.3          
  ALLERGY UNSPECIFIED NOT ELSEWHERE CLASSIFIED                     

 

 2011            MAN DO, FESTUS K                         995.3          
  ALLERGY UNSPECIFIED NOT ELSEWHERE CLASSIFIED                     

 

 2011            MAN DO, FESTUS K                         995.3          
  ALLERGY UNSPECIFIED NOT ELSEWHERE CLASSIFIED                     

 

 2011            MAN DO, FESTUS K                         995.3          
  ALLERGY UNSPECIFIED NOT ELSEWHERE CLASSIFIED                     

 

 2011            MAN DO, FESTUS K                         995.3          
  ALLERGY UNSPECIFIED NOT ELSEWHERE CLASSIFIED                     

 

 2011            MAN DO, FESTUS K                         995.3          
  ALLERGY UNSPECIFIED NOT ELSEWHERE CLASSIFIED                     

 

 2011            MAN DO, FESTUS K                         995.3          
  ALLERGY UNSPECIFIED NOT ELSEWHERE CLASSIFIED                     

 

 2011            MAN DO, FESTUS K                         995.3          
  ALLERGY UNSPECIFIED NOT ELSEWHERE CLASSIFIED                     

 

 2011            MAN DO, FESTUS K                         995.3          
  ALLERGY UNSPECIFIED NOT ELSEWHERE CLASSIFIED                     

 

 2011            MAN DO, FESTUS K                         995.3          
  ALLERGY UNSPECIFIED NOT ELSEWHERE CLASSIFIED                     

 

 2011            MAN DO, FESTUS K                         995.3          
  ALLERGY UNSPECIFIED NOT ELSEWHERE CLASSIFIED                     

 

 2011            MAN DO, FESTUS K                         995.3          
  ALLERGY UNSPECIFIED NOT ELSEWHERE CLASSIFIED                     

 

 2011            MAN DO, FESTUS K                         995.3          
  ALLERGY UNSPECIFIED NOT ELSEWHERE CLASSIFIED                     

 

 2011                                      477.0            ALLERGIC 
RHINITIS DUE TO POLLEN                     

 

 2011                                      477.0            ALLERGIC 
RHINITIS DUE TO POLLEN                     

 

 2011                                      477.0            ALLERGIC 
RHINITIS DUE TO POLLEN                     

 

 2011                                      477.0            ALLERGIC 
RHINITIS DUE TO POLLEN                     

 

 2011                                      477.0            ALLERGIC 
RHINITIS DUE TO POLLEN                     

 

 2011                                      477.0            ALLERGIC 
RHINITIS DUE TO POLLEN                     

 

 2011                                      477.0            ALLERGIC 
RHINITIS DUE TO POLLEN                     

 

 2011            MAN DO, FESTUS K                         477.0          
  ALLERGIC RHINITIS DUE TO POLLEN                     

 

 2011            MAN DO, FESTUS K                         477.0          
  ALLERGIC RHINITIS DUE TO POLLEN                     

 

 2011            MAN DO, FESTUS K                         477.0          
  ALLERGIC RHINITIS DUE TO POLLEN                     

 

 2011            MAN DO, FESTUS K                         477.0          
  ALLERGIC RHINITIS DUE TO POLLEN                     

 

 2011            MAN DO, FESTUS K                         477.0          
  ALLERGIC RHINITIS DUE TO POLLEN                     

 

 2011            MAN DO, FESTUS K                         477.0          
  ALLERGIC RHINITIS DUE TO POLLEN                     

 

 2011            MAN DO, FESTUS K                         477.0          
  ALLERGIC RHINITIS DUE TO POLLEN                     

 

 2011            MAN DO, FESTUS K                         477.0          
  ALLERGIC RHINITIS DUE TO POLLEN                     

 

 2011            MAN DO, FESTUS K                         477.0          
  ALLERGIC RHINITIS DUE TO POLLEN                     

 

 2011            MAN DO, FESTUS K                         477.0          
  ALLERGIC RHINITIS DUE TO POLLEN                     

 

 2011            MAN DO, FESTUS K                         477.0          
  ALLERGIC RHINITIS DUE TO POLLEN                     

 

 2011            MAN DO, FESTUS K                         477.0          
  ALLERGIC RHINITIS DUE TO POLLEN                     

 

 2011            MAN DO, FESTUS K                         477.0          
  ALLERGIC RHINITIS DUE TO POLLEN                     

 

 2011            MAN DO, FESTUS K                         477.0          
  ALLERGIC RHINITIS DUE TO POLLEN                     

 

 2011            MAN DO, FESTUS K                         477.0          
  ALLERGIC RHINITIS DUE TO POLLEN                     

 

 2011            MAN DO, FESTUS K                         477.0          
  ALLERGIC RHINITIS DUE TO POLLEN                     

 

 2011            MAN DO, FESTUS K                         477.0          
  ALLERGIC RHINITIS DUE TO POLLEN                     

 

 2011            MAN DO, FESTUS K                         477.0          
  ALLERGIC RHINITIS DUE TO POLLEN                     

 

 2011            MAN DO, FESTUS K                         477.0          
  ALLERGIC RHINITIS DUE TO POLLEN                     

 

 2011            MAN DO, FESTUS K                         477.0          
  ALLERGIC RHINITIS DUE TO POLLEN                     

 

 2011            MAN DO, FESTUS K                         477.0          
  ALLERGIC RHINITIS DUE TO POLLEN                     

 

 2011            MAN DO, FESTUS K                         477.0          
  ALLERGIC RHINITIS DUE TO POLLEN                     

 

 2011            MAN DO, FESTUS K                         477.0          
  ALLERGIC RHINITIS DUE TO POLLEN                     

 

 2011            MAN DO, FESTUS K                         477.0          
  ALLERGIC RHINITIS DUE TO POLLEN                     

 

 2011            MAN DO, FESTUS K                         477.0          
  ALLERGIC RHINITIS DUE TO POLLEN                     

 

 2011            MAN DO, FESTUS K                         477.0          
  ALLERGIC RHINITIS DUE TO POLLEN                     

 

 2011            MAN DO, FESTUS K                         477.0          
  ALLERGIC RHINITIS DUE TO POLLEN                     

 

 2011            MAN DO, FESTUS K                         477.0          
  ALLERGIC RHINITIS DUE TO POLLEN                     

 

 2011            MAN DO, FESTUS K                         477.0          
  ALLERGIC RHINITIS DUE TO POLLEN                     

 

 2011            MAN DO, FESTUS K                         477.0          
  ALLERGIC RHINITIS DUE TO POLLEN                     

 

 2011            MAN DO, FESTUS K                         477.0          
  ALLERGIC RHINITIS DUE TO POLLEN                     

 

 2011            MAN DO, FESTUS K                         477.0          
  ALLERGIC RHINITIS DUE TO POLLEN                     

 

 2011            MAN DO, FESTUS K                         477.0          
  ALLERGIC RHINITIS DUE TO POLLEN                     

 

 2011            MAN DO, FESTUS K                         477.0          
  ALLERGIC RHINITIS DUE TO POLLEN                     

 

 2011            MAN DO, FESTUS K                         477.0          
  ALLERGIC RHINITIS DUE TO POLLEN                     

 

 2011            MAN DO, FESTUS K                         477.0          
  ALLERGIC RHINITIS DUE TO POLLEN                     

 

 2011            MAN DO, FESTUS K                         477.0          
  ALLERGIC RHINITIS DUE TO POLLEN                     

 

 2011            MAN DO, FESTUS K                         477.0          
  ALLERGIC RHINITIS DUE TO POLLEN                     

 

 2011            MAN DO, FESTUS K                         477.0          
  ALLERGIC RHINITIS DUE TO POLLEN                     

 

 2011            MAN DO, FESTUS K                         477.0          
  ALLERGIC RHINITIS DUE TO POLLEN                     

 

 2011            MAN DO, FESTUS K                         477.0          
  ALLERGIC RHINITIS DUE TO POLLEN                     

 

 2011            MAN DO, FESTUS K                         477.0          
  ALLERGIC RHINITIS DUE TO POLLEN                     

 

 2011            MAN DO, FESTUS K                         477.0          
  ALLERGIC RHINITIS DUE TO POLLEN                     

 

 2012                                      486            PNEUMONIA 
UNSPECIFIED                     

 

 2012                                      486            PNEUMONIA 
UNSPECIFIED                     

 

 2012                                      486            PNEUMONIA 
UNSPECIFIED                     

 

 2012                                      486            PNEUMONIA 
UNSPECIFIED                     

 

 2012                                      486            PNEUMONIA 
UNSPECIFIED                     

 

 2012                                      486            PNEUMONIA 
UNSPECIFIED                     

 

 2012                                      486            PNEUMONIA 
UNSPECIFIED                     

 

 2012            MAN DO, FESTUS K                         486            
PNEUMONIA UNSPECIFIED                     

 

 2012            MAN DO, FESTUS K                         486            
PNEUMONIA UNSPECIFIED                     

 

 2012            MAN DO, FESTUS K                         486            
PNEUMONIA UNSPECIFIED                     

 

 2012            MAN DO, FESTUS K                         486            
PNEUMONIA UNSPECIFIED                     

 

 2012            MAN DO, FESTUS K                         486            
PNEUMONIA UNSPECIFIED                     

 

 2012            MAN DO, FESTUS K                         486            
PNEUMONIA UNSPECIFIED                     

 

 2012            MAN DO, FESTUS K                         486            
PNEUMONIA UNSPECIFIED                     

 

 2012            MAN DO, FESTUS K                         486            
PNEUMONIA UNSPECIFIED                     

 

 2012            MAN DO, FESTUS K                         486            
PNEUMONIA UNSPECIFIED                     

 

 2012            MAN DO, FESTUS K                         486            
PNEUMONIA UNSPECIFIED                     

 

 2012            MAN DO, FESTUS K                         486            
PNEUMONIA UNSPECIFIED                     

 

 2012            MAN DO, FESTUS K                         486            
PNEUMONIA UNSPECIFIED                     

 

 2012            MAN DO, FESTUS K                         486            
PNEUMONIA UNSPECIFIED                     

 

 2012            MAN DO, FESTUS K                         486            
PNEUMONIA UNSPECIFIED                     

 

 2012            MAN DO, FESTUS K                         486            
PNEUMONIA UNSPECIFIED                     

 

 2012            MAN DO, FESTUS K                         486            
PNEUMONIA UNSPECIFIED                     

 

 2012            MAN DO, FESTUS K                         486            
PNEUMONIA UNSPECIFIED                     

 

 2012            MAN DO, FESTUS K                         486            
PNEUMONIA UNSPECIFIED                     

 

 2012            MAN DO, FESTUS K                         486            
PNEUMONIA UNSPECIFIED                     

 

 2012            MAN DO, FESTUS K                         486            
PNEUMONIA UNSPECIFIED                     

 

 2012            MAN DO, FESTUS K                         486            
PNEUMONIA UNSPECIFIED                     

 

 2012            MAN DO, FESTUS K                         486            
PNEUMONIA UNSPECIFIED                     

 

 2012            MAN DO, FESTUS K                         486            
PNEUMONIA UNSPECIFIED                     

 

 2012            MAN DO, FESTUS K                         486            
PNEUMONIA UNSPECIFIED                     

 

 2012            MAN DO, FESTUS K                         486            
PNEUMONIA UNSPECIFIED                     

 

 2012            MAN DO, FESTUS K                         486            
PNEUMONIA UNSPECIFIED                     

 

 2012            MAN DO, FESTUS K                         486            
PNEUMONIA UNSPECIFIED                     

 

 2012            MAN DO, FESTUS K                         486            
PNEUMONIA UNSPECIFIED                     

 

 2012            MAN DO, FESTUS K                         486            
PNEUMONIA UNSPECIFIED                     

 

 2012            MAN DO, FESTUS K                         486            
PNEUMONIA UNSPECIFIED                     

 

 2012            MAN DO, FESTUS K                         486            
PNEUMONIA UNSPECIFIED                     

 

 2012            MAN DO, FESTUS K                         486            
PNEUMONIA UNSPECIFIED                     

 

 2012            MAN DO, FESTUS K                         486            
PNEUMONIA UNSPECIFIED                     

 

 2012            MAN DO, FESTUS K                         486            
PNEUMONIA UNSPECIFIED                     

 

 2012            MAN DO, FESTUS K                         486            
PNEUMONIA UNSPECIFIED                     

 

 2012            MAN DO, FESTUS K                         486            
PNEUMONIA UNSPECIFIED                     

 

 2012            MAN DO, FESTUS K                         486            
PNEUMONIA UNSPECIFIED                     

 

 2012            MAN DO, FESTUS K                         486            
PNEUMONIA UNSPECIFIED                     

 

 2012            MAN DO, FESTUS K                         486            
PNEUMONIA UNSPECIFIED                     

 

 2012            MAN DO, FESTUS K                         486            
PNEUMONIA UNSPECIFIED                     

 

 2012            MAN DO, FESTUS K                         486            
PNEUMONIA UNSPECIFIED                     

 

 2012            MAN DO, FESTUS K                         486            
PNEUMONIA UNSPECIFIED                     

 

 2012            MAN DO, FESTUS K                         486            
PNEUMONIA UNSPECIFIED                     

 

 2012                         Ot            466.0            ACUTE 
BRONCHITIS                     

 

 2012                         Ot            786.05            SHORTNESS 
OF BREATH                     

 

 2012                         Ot            V58.69            OTH MED,LT,
CURRENT USE                     

 

 2012                         Ot            244.9            
HYPOTHYROIDISM NOS                     

 

 2012                         Ot            401.9            HYPERTENSION 
NOS                     

 

 2012                         Ot            482.83            PNEUMONIA 
DUE TO OTHER GRAM-NEGATIVE YAZ                     

 

 2012                         Ot            494.0            
BRONCHIECTASIS W/O ACUTE EXACERBATION                     

 

 2012                         Ot            786.50            CHEST PAIN 
NOS                     

 

 2012                                      786.2            cough        
             

 

 2012                                      786.2            cough        
             

 

 2012                                      786.2            cough        
             

 

 2012                                      786.2            cough        
             

 

 2012                                      786.2            cough        
             

 

 2012                                      786.2            cough        
             

 

 2012                                      786.2            cough        
             

 

 2012            MAN DO, FETSUS K                         786.2          
  cough                     

 

 2012            MAN DO, FESTUS K                         786.2          
  cough                     

 

 2012            MAN DO, FESTUS K                         786.2          
  cough                     

 

 2012            MAN DO, FESTUS K                         786.2          
  cough                     

 

 2012            MAN DO, FESTUS K                         786.2          
  cough                     

 

 2012            MAN DO, FESTUS K                         786.2          
  cough                     

 

 2012            MAN DO, FESTUS K                         786.2          
  cough                     

 

 2012            MAN DO, FESTUS K                         786.2          
  cough                     

 

 2012            MAN DO, FESTUS K                         786.2          
  cough                     

 

 2012            MAN DO, FESTUS K                         786.2          
  cough                     

 

 2012            MAN DO, FESTUS K                         786.2          
  cough                     

 

 2012            MAN DO, FESTUS K                         786.2          
  cough                     

 

 2012            MAN DO, FESTUS K                         786.2          
  cough                     

 

 2012            MAN DO, FESTUS K                         786.2          
  cough                     

 

 2012            MAN DO, FESTUS K                         786.2          
  cough                     

 

 2012            MAN DO, FESTUS K                         786.2          
  cough                     

 

 2012            MAN DO, FESTUS K                         786.2          
  cough                     

 

 2012            MAN DO, FESTUS K                         786.2          
  cough                     

 

 2012            MAN DO, FESTUS K                         786.2          
  cough                     

 

 2012            MAN DO, FESTUS K                         786.2          
  cough                     

 

 2012            MAN DO, FESTUS K                         786.2          
  cough                     

 

 2012            MAN DO, FESTUS K                         786.2          
  cough                     

 

 2012            MAN DO, FESTUS K                         786.2          
  cough                     

 

 2012            MNA DO, FESTUS K                         786.2          
  cough                     

 

 2012            MAN DO, FESTUS K                         786.2          
  cough                     

 

 2012            MAN DO, FESTUS K                         786.2          
  cough                     

 

 2012            MAN DO, FESTUS K                         786.2          
  cough                     

 

 2012            MAN DO, FESTUS K                         786.2          
  cough                     

 

 2012            MAN DO, FESTUS K                         786.2          
  cough                     

 

 2012            MAN DO, FESTUS K                         786.2          
  cough                     

 

 2012            MAN DO, FESTUS K                         786.2          
  cough                     

 

 2012            MAN DO, FESTUS K                         786.2          
  cough                     

 

 2012            MAN DO, FESTUS K                         786.2          
  cough                     

 

 2012            MAN DO, FESTUS K                         786.2          
  cough                     

 

 2012            MAN DO, FESTUS K                         786.2          
  cough                     

 

 2012            MAN DO, FESTUS K                         786.2          
  cough                     

 

 2012            MAN DO, FESTUS K                         786.2          
  cough                     

 

 2012            MAN DO, FESTUS K                         786.2          
  cough                     

 

 2012            MAN DO, FESTUS K                         786.2          
  cough                     

 

 2012            MAN DO, FESTUS K                         786.2          
  cough                     

 

 2012            MAN DO, FESTUS K                         786.2          
  cough                     

 

 2012            MAN DO, FESTUS K                         786.2          
  cough                     

 

 2012            MAN DO, FESTUS K                         786.2          
  cough                     

 

 2013                                      465.9            ACUTE UPPER 
RESPIRATORY INFECTIONS OF UNSPECIFIED SITE                     

 

 2013                                      465.9            ACUTE UPPER 
RESPIRATORY INFECTIONS OF UNSPECIFIED SITE                     

 

 2013                                      465.9            ACUTE UPPER 
RESPIRATORY INFECTIONS OF UNSPECIFIED SITE                     

 

 2013                                      465.9            ACUTE UPPER 
RESPIRATORY INFECTIONS OF UNSPECIFIED SITE                     

 

 2013            MAN DO, FESTUS K                         465.9          
  ACUTE UPPER RESPIRATORY INFECTIONS OF UNSPECIFIED SITE                     

 

 2013            MAN DO, FESTUS K                         465.9          
  ACUTE UPPER RESPIRATORY INFECTIONS OF UNSPECIFIED SITE                     

 

 2013            MAN DO, FESTUS K                         465.9          
  ACUTE UPPER RESPIRATORY INFECTIONS OF UNSPECIFIED SITE                     

 

 2013            MAN DO, FESTUS K                         465.9          
  ACUTE UPPER RESPIRATORY INFECTIONS OF UNSPECIFIED SITE                     

 

 2013            MAN DO, FESTUS K                         465.9          
  ACUTE UPPER RESPIRATORY INFECTIONS OF UNSPECIFIED SITE                     

 

 2013            MAN DO, FESTUS K                         465.9          
  ACUTE UPPER RESPIRATORY INFECTIONS OF UNSPECIFIED SITE                     

 

 2013            MAN DO, FESTUS K                         465.9          
  ACUTE UPPER RESPIRATORY INFECTIONS OF UNSPECIFIED SITE                     

 

 2013            MAN DO, FESTUS K                         465.9          
  ACUTE UPPER RESPIRATORY INFECTIONS OF UNSPECIFIED SITE                     

 

 2013            MAN DO, FESTUS K                         465.9          
  ACUTE UPPER RESPIRATORY INFECTIONS OF UNSPECIFIED SITE                     

 

 2013            MAN DO, FESTUS K                         465.9          
  ACUTE UPPER RESPIRATORY INFECTIONS OF UNSPECIFIED SITE                     

 

 2013            MAN DO, FESTUS K                         465.9          
  ACUTE UPPER RESPIRATORY INFECTIONS OF UNSPECIFIED SITE                     

 

 2013            MAN DO, FESTUS K                         465.9          
  ACUTE UPPER RESPIRATORY INFECTIONS OF UNSPECIFIED SITE                     

 

 2013            MAN DO, FESTUS K                         465.9          
  ACUTE UPPER RESPIRATORY INFECTIONS OF UNSPECIFIED SITE                     

 

 2013            MAN DO, FESTUS K                         465.9          
  ACUTE UPPER RESPIRATORY INFECTIONS OF UNSPECIFIED SITE                     

 

 2013            MAN DO, FESTUS K                         465.9          
  ACUTE UPPER RESPIRATORY INFECTIONS OF UNSPECIFIED SITE                     

 

 2013            MAN DO, FESTUS K                         465.9          
  ACUTE UPPER RESPIRATORY INFECTIONS OF UNSPECIFIED SITE                     

 

 2013            MAN DO, FESTUS K                         465.9          
  ACUTE UPPER RESPIRATORY INFECTIONS OF UNSPECIFIED SITE                     

 

 2013            MAN DO, FESTUS K                         465.9          
  ACUTE UPPER RESPIRATORY INFECTIONS OF UNSPECIFIED SITE                     

 

 2013            MAN DO, FESTUS K                         465.9          
  ACUTE UPPER RESPIRATORY INFECTIONS OF UNSPECIFIED SITE                     

 

 2013            MAN DO, FESTUS K                         465.9          
  ACUTE UPPER RESPIRATORY INFECTIONS OF UNSPECIFIED SITE                     

 

 2013            MAN DO, FESTUS K                         465.9          
  ACUTE UPPER RESPIRATORY INFECTIONS OF UNSPECIFIED SITE                     

 

 2013            MAN DO, FESTUS K                         465.9          
  ACUTE UPPER RESPIRATORY INFECTIONS OF UNSPECIFIED SITE                     

 

 2013            MAN DO, FESTUS K                         465.9          
  ACUTE UPPER RESPIRATORY INFECTIONS OF UNSPECIFIED SITE                     

 

 2013            MAN DO, FESTUS K                         465.9          
  ACUTE UPPER RESPIRATORY INFECTIONS OF UNSPECIFIED SITE                     

 

 2013            MAN DO, FESTUS K                         465.9          
  ACUTE UPPER RESPIRATORY INFECTIONS OF UNSPECIFIED SITE                     

 

 2013            MAN DO, FESTUS K                         465.9          
  ACUTE UPPER RESPIRATORY INFECTIONS OF UNSPECIFIED SITE                     

 

 2013            MAN DO, FESTUS K                         465.9          
  ACUTE UPPER RESPIRATORY INFECTIONS OF UNSPECIFIED SITE                     

 

 2013            MAN DO, FESTUS K                         465.9          
  ACUTE UPPER RESPIRATORY INFECTIONS OF UNSPECIFIED SITE                     

 

 2013            MAN DO, FESTUS K                         465.9          
  ACUTE UPPER RESPIRATORY INFECTIONS OF UNSPECIFIED SITE                     

 

 2013            MAN DO, FESTUS K                         465.9          
  ACUTE UPPER RESPIRATORY INFECTIONS OF UNSPECIFIED SITE                     

 

 2013            MAN DO, FESTUS K                         465.9          
  ACUTE UPPER RESPIRATORY INFECTIONS OF UNSPECIFIED SITE                     

 

 2013            MAN DO, FESTUS K                         465.9          
  ACUTE UPPER RESPIRATORY INFECTIONS OF UNSPECIFIED SITE                     

 

 2013            MAN DO, FESTUS K                         465.9          
  ACUTE UPPER RESPIRATORY INFECTIONS OF UNSPECIFIED SITE                     

 

 2013            MAN DO, FESTUS K                         465.9          
  ACUTE UPPER RESPIRATORY INFECTIONS OF UNSPECIFIED SITE                     

 

 2013            MAN DO, FESTUS K                         465.9          
  ACUTE UPPER RESPIRATORY INFECTIONS OF UNSPECIFIED SITE                     

 

 2013            MAN DO, FESTUS K                         465.9          
  ACUTE UPPER RESPIRATORY INFECTIONS OF UNSPECIFIED SITE                     

 

 2013            MAN DO, FESTUS K                         465.9          
  ACUTE UPPER RESPIRATORY INFECTIONS OF UNSPECIFIED SITE                     

 

 2013            MAN DO, FESTUS K                         465.9          
  ACUTE UPPER RESPIRATORY INFECTIONS OF UNSPECIFIED SITE                     

 

 2013            MAN DO, FESTUS K                         465.9          
  ACUTE UPPER RESPIRATORY INFECTIONS OF UNSPECIFIED SITE                     

 

 2013            MAN DO, FESTUS K                         465.9          
  ACUTE UPPER RESPIRATORY INFECTIONS OF UNSPECIFIED SITE                     

 

 2013            MAN DO, FESTUS K                         465.9          
  ACUTE UPPER RESPIRATORY INFECTIONS OF UNSPECIFIED SITE                     

 

 2013            MAN DO, FESTUS K                         465.9          
  ACUTE UPPER RESPIRATORY INFECTIONS OF UNSPECIFIED SITE                     

 

 2013            MAN DO, FESTUS K                         465.9          
  ACUTE UPPER RESPIRATORY INFECTIONS OF UNSPECIFIED SITE                     

 

 2013                                      477.9            ALLERGIC 
RHINITIS CAUSE UNSPECIFIED                     

 

 2013            MAN DO, FESTUS K                         477.9          
  ALLERGIC RHINITIS CAUSE UNSPECIFIED                     

 

 2013            MAN DO, FESTUS K                         477.9          
  ALLERGIC RHINITIS CAUSE UNSPECIFIED                     

 

 2013            MAN DO, FESTUS K                         477.9          
  ALLERGIC RHINITIS CAUSE UNSPECIFIED                     

 

 2013            MAN DO, FESTUS K                         477.9          
  ALLERGIC RHINITIS CAUSE UNSPECIFIED                     

 

 2013            MAN DO, FESTUS K                         477.9          
  ALLERGIC RHINITIS CAUSE UNSPECIFIED                     

 

 2013            MAN DO, FESTUS K                         477.9          
  ALLERGIC RHINITIS CAUSE UNSPECIFIED                     

 

 2013            MAN DO, FESTUS K                         477.9          
  ALLERGIC RHINITIS CAUSE UNSPECIFIED                     

 

 2013            MAN DO, FESTUS K                         477.9          
  ALLERGIC RHINITIS CAUSE UNSPECIFIED                     

 

 2013            MAN DO, FESTUS K                         477.9          
  ALLERGIC RHINITIS CAUSE UNSPECIFIED                     

 

 2013            MAN DO, FESTUS K                         477.9          
  ALLERGIC RHINITIS CAUSE UNSPECIFIED                     

 

 2013            MAN DO, FESTUS K                         477.9          
  ALLERGIC RHINITIS CAUSE UNSPECIFIED                     

 

 2013            MAN DO, FESTUS K                         477.9          
  ALLERGIC RHINITIS CAUSE UNSPECIFIED                     

 

 2013            MAN DO, FESTUS K                         477.9          
  ALLERGIC RHINITIS CAUSE UNSPECIFIED                     

 

 2013            MAN DO, FESTUS K                         477.9          
  ALLERGIC RHINITIS CAUSE UNSPECIFIED                     

 

 2013            MAN DO, FESTUS K                         477.9          
  ALLERGIC RHINITIS CAUSE UNSPECIFIED                     

 

 2013            MAN DO, FESTUS K                         477.9          
  ALLERGIC RHINITIS CAUSE UNSPECIFIED                     

 

 2013            MAN DO, FESTUS K                         477.9          
  ALLERGIC RHINITIS CAUSE UNSPECIFIED                     

 

 2013            MAN DO, FESTUS K                         477.9          
  ALLERGIC RHINITIS CAUSE UNSPECIFIED                     

 

 2013            MAN DO, FESTUS K                         477.9          
  ALLERGIC RHINITIS CAUSE UNSPECIFIED                     

 

 2013            MAN DO, FESTUS K                         477.9          
  ALLERGIC RHINITIS CAUSE UNSPECIFIED                     

 

 2013            MAN DO, FESTUS K                         477.9          
  ALLERGIC RHINITIS CAUSE UNSPECIFIED                     

 

 2013            MAN DO, FESTUS K                         477.9          
  ALLERGIC RHINITIS CAUSE UNSPECIFIED                     

 

 2013            MAN DO, FESTUS K                         477.9          
  ALLERGIC RHINITIS CAUSE UNSPECIFIED                     

 

 2013            MAN DO, FESTUS K                         477.9          
  ALLERGIC RHINITIS CAUSE UNSPECIFIED                     

 

 2013            MAN DO, FESTUS K                         477.9          
  ALLERGIC RHINITIS CAUSE UNSPECIFIED                     

 

 2013            MAN DO, FESTUS K                         477.9          
  ALLERGIC RHINITIS CAUSE UNSPECIFIED                     

 

 2013            MAN DO, FESTUS K                         477.9          
  ALLERGIC RHINITIS CAUSE UNSPECIFIED                     

 

 2013            MAN DO, FESTUS K                         477.9          
  ALLERGIC RHINITIS CAUSE UNSPECIFIED                     

 

 2013            MAN DO, FESTUS K                         477.9          
  ALLERGIC RHINITIS CAUSE UNSPECIFIED                     

 

 2013            MAN DO, FESTUS K                         477.9          
  ALLERGIC RHINITIS CAUSE UNSPECIFIED                     

 

 2013            MAN DO, FESTUS K                         477.9          
  ALLERGIC RHINITIS CAUSE UNSPECIFIED                     

 

 2013            MAN DO, FESTUS K                         477.9          
  ALLERGIC RHINITIS CAUSE UNSPECIFIED                     

 

 2013            MAN DO, FESTUS K                         477.9          
  ALLERGIC RHINITIS CAUSE UNSPECIFIED                     

 

 2013            MAN DO, FESTUS K                         477.9          
  ALLERGIC RHINITIS CAUSE UNSPECIFIED                     

 

 2013            MAN DO, FESTUS K                         477.9          
  ALLERGIC RHINITIS CAUSE UNSPECIFIED                     

 

 2013            MAN DO, FESTUS K                         477.9          
  ALLERGIC RHINITIS CAUSE UNSPECIFIED                     

 

 2013            MAN DO, FESTUS K                         477.9          
  ALLERGIC RHINITIS CAUSE UNSPECIFIED                     

 

 2013            MAN DO, FESTUS K                         477.9          
  ALLERGIC RHINITIS CAUSE UNSPECIFIED                     

 

 2013            MAN DO, FESTUS K                         477.9          
  ALLERGIC RHINITIS CAUSE UNSPECIFIED                     

 

 2013            MAN DO, FESTUS K                         477.9          
  ALLERGIC RHINITIS CAUSE UNSPECIFIED                     

 

 2013            MAN DO, FESTUS K                         477.9          
  ALLERGIC RHINITIS CAUSE UNSPECIFIED                     

 

 2013            MAN DO, FESTUS K                         477.9          
  ALLERGIC RHINITIS CAUSE UNSPECIFIED                     

 

 2013            MAN DO, FESTUS K                         477.9          
  ALLERGIC RHINITIS CAUSE UNSPECIFIED                     

 

 10/23/2013            MAN DO, FESTUS K                         V04.81         
   FLU SHOT                     

 

 10/23/2013            MAN DO, FESTUS K                         V04.81         
   FLU SHOT                     

 

 10/23/2013            MAN DO, FESTUS K                         V04.81         
   FLU SHOT                     

 

 10/23/2013            MAN DO, FESTUS K                         V04.81         
   FLU SHOT                     

 

 10/23/2013            MAN DO, FESTUS K                         V04.81         
   FLU SHOT                     

 

 10/23/2013            MAN DO, FESTUS K                         V04.81         
   FLU SHOT                     

 

 10/23/2013            MAN DO, FESTUS K                         V04.81         
   FLU SHOT                     

 

 10/23/2013            MAN DO, FESTUS K                         V04.81         
   FLU SHOT                     

 

 10/23/2013            MAN DO, FESTUS K                         V04.81         
   FLU SHOT                     

 

 10/23/2013            MAN DO, FESTUS K                         V04.81         
   FLU SHOT                     

 

 10/23/2013            MAN DO, FESTUS K                         V04.81         
   FLU SHOT                     

 

 10/23/2013            MAN DO, FESTUS K                         V04.81         
   FLU SHOT                     

 

 10/23/2013            MAN DO, FESTUS K                         V04.81         
   FLU SHOT                     

 

 10/23/2013            MAN DO, FESTUS K                         V04.81         
   FLU SHOT                     

 

 10/23/2013            MAN DO, FESTUS K                         V04.81         
   FLU SHOT                     

 

 10/23/2013            MAN DO, FESTUS K                         V04.81         
   FLU SHOT                     

 

 10/23/2013            MAN DO, FESTUS K                         V04.81         
   FLU SHOT                     

 

 10/23/2013            MAN DO, FESTUS K                         V04.81         
   FLU SHOT                     

 

 10/23/2013            MAN DO, FESTUS K                         V04.81         
   FLU SHOT                     

 

 10/23/2013            MAN DO, FESTUS K                         V04.81         
   FLU SHOT                     

 

 10/23/2013            MAN DO, FESTUS K                         V04.81         
   FLU SHOT                     

 

 10/23/2013            MAN DO, FESTUS K                         V04.81         
   FLU SHOT                     

 

 10/23/2013            MAN DO, FESTUS K                         V04.81         
   FLU SHOT                     

 

 10/23/2013            MAN DO, FESTUS K                         V04.81         
   FLU SHOT                     

 

 10/23/2013            MAN DO, FESTUS K                         V04.81         
   FLU SHOT                     

 

 10/23/2013            MAN DO, FESTUS K                         V04.81         
   FLU SHOT                     

 

 10/23/2013            MAN DO, FESTUS K                         V04.81         
   FLU SHOT                     

 

 10/23/2013            MAN DO, FESTUS K                         V04.81         
   FLU SHOT                     

 

 10/23/2013            MAN DO, FESTUS K                         V04.81         
   FLU SHOT                     

 

 10/23/2013            MAN DO, FESTUS K                         V04.81         
   FLU SHOT                     

 

 10/23/2013            MAN DO, FESTUS K                         V04.81         
   FLU SHOT                     

 

 10/23/2013            MAN DO, FESTUS K                         V04.81         
   FLU SHOT                     

 

 10/23/2013            MAN DO, FESTUS K                         V04.81         
   FLU SHOT                     

 

 10/23/2013            MAN DO, FESTUS K                         V04.81         
   FLU SHOT                     

 

 10/23/2013            MAN DO, FESTUS K                         V04.81         
   FLU SHOT                     

 

 10/23/2013            MAN DO, FESTUS K                         V04.81         
   FLU SHOT                     

 

 10/23/2013            MAN DO, FESTUS K                         V04.81         
   FLU SHOT                     

 

 10/23/2013            MAN DO, FESTUS K                         V04.81         
   FLU SHOT                     

 

 10/23/2013            MAN DO, FESTUS K                         V04.81         
   FLU SHOT                     

 

 10/23/2013            MAN DO, FESTUS K                         V04.81         
   FLU SHOT                     

 

 10/23/2013            MAN DO, FESTUS K                         V04.81         
   FLU SHOT                     

 

 2014            MAN DO, FESTUS K                         V05.8          
  ZOSTAVAX DX                     

 

 2014            MAN DO, FESTUS K                         V05.8          
  ZOSTAVAX DX                     

 

 2014            MAN DO, FESTUS K                         V05.8          
  ZOSTAVAX DX                     

 

 2014            MAN DO, FESTUS K                         V05.8          
  ZOSTAVAX DX                     

 

 2014            MAN DO, FESTUS K                         V05.8          
  ZOSTAVAX DX                     

 

 2014            MAN DO, FESTUS K                         V05.8          
  ZOSTAVAX DX                     

 

 2014            MAN DO, FESTUS K                         V05.8          
  ZOSTAVAX DX                     

 

 2014            MAN DO, FESTUS K                         V05.8          
  ZOSTAVAX DX                     

 

 2014            MAN DO, FESTUS K                         V05.8          
  ZOSTAVAX DX                     

 

 2014            MAN DO, FESTUS K                         V05.8          
  ZOSTAVAX DX                     

 

 2014            MAN DO, FESTUS K                         V05.8          
  ZOSTAVAX DX                     

 

 2014            MAN DO, FESTUS K                         V05.8          
  ZOSTAVAX DX                     

 

 2014            MAN DO, FESTUS K                         V05.8          
  ZOSTAVAX DX                     

 

 2014            MAN DO, FESTUS K                         V05.8          
  ZOSTAVAX DX                     

 

 2014            MAN DO, FESTUS K                         V05.8          
  ZOSTAVAX DX                     

 

 2014            MAN DO, FESTUS K                         V05.8          
  ZOSTAVAX DX                     

 

 2014            MAN DO, FESTUS K                         V05.8          
  ZOSTAVAX DX                     

 

 2014            MAN DO, FESTUS K                         V05.8          
  ZOSTAVAX DX                     

 

 2014            MAN DO, FESTUS K                         V05.8          
  ZOSTAVAX DX                     

 

 2014            MAN DO, FESTUS K                         V05.8          
  ZOSTAVAX DX                     

 

 2014            MAN DO, FESTUS K                         V05.8          
  ZOSTAVAX DX                     

 

 2014            MAN DO, FESTUS K                         V05.8          
  ZOSTAVAX DX                     

 

 2014            MAN DO, FESTUS K                         V05.8          
  ZOSTAVAX DX                     

 

 2014            MAN DO, FESTUS K                         V05.8          
  ZOSTAVAX DX                     

 

 2014            MAN DO, FESTUS K                         V05.8          
  ZOSTAVAX DX                     

 

 2014            MAN DO, FESTUS K                         V05.8          
  ZOSTAVAX DX                     

 

 2014            MAN DO, FESTUS K                         V05.8          
  ZOSTAVAX DX                     

 

 2014            MAN DO, FESTUS K                         V05.8          
  ZOSTAVAX DX                     

 

 2014            MAN DO, FESTUS K                         V05.8          
  ZOSTAVAX DX                     

 

 2014            MAN DO, FESTUS K                         V05.8          
  ZOSTAVAX DX                     

 

 2014            MAN DO, FESTUS K                         V05.8          
  ZOSTAVAX DX                     

 

 2014            MAN DO, FESTUS K                         V05.8          
  ZOSTAVAX DX                     

 

 2014            MAN DO, FESTUS K                         V05.8          
  ZOSTAVAX DX                     

 

 2014            MAN DO, FESTUS K                         733.92         
   CHONDROMALACIA                     

 

 2014            MAN DO, FESTUS K                         733.92         
   CHONDROMALACIA                     

 

 2014            MAN DO, FESTUS K                         733.92         
   CHONDROMALACIA                     

 

 2014            MAN DO, FESTUS K                         733.92         
   CHONDROMALACIA                     

 

 2014            MAN DO, FESTUS K                         733.92         
   CHONDROMALACIA                     

 

 2014            MAN DO, FESTUS K                         733.92         
   CHONDROMALACIA                     

 

 2014            MAN DO, FESTUS K                         733.92         
   CHONDROMALACIA                     

 

 2014            MAN DO, FESTUS K                         733.92         
   CHONDROMALACIA                     

 

 2014            MAN DO, FESTUS K                         733.92         
   CHONDROMALACIA                     

 

 2014            MAN DO, FESTUS K                         733.92         
   CHONDROMALACIA                     

 

 2014            MAN DO, FESTUS K                         733.92         
   CHONDROMALACIA                     

 

 2014            MAN DO, FESTUS K                         733.92         
   CHONDROMALACIA                     

 

 2014            MAN DO, FESTUS K                         733.92         
   CHONDROMALACIA                     

 

 2014            MAN DO, FESTUS K                         733.92         
   CHONDROMALACIA                     

 

 2014            MAN DO, FESTUS K                         733.92         
   CHONDROMALACIA                     

 

 2014            MAN DO, FESTUS K                         733.92         
   CHONDROMALACIA                     

 

 2014            MAN DO, FESTUS K                         733.92         
   CHONDROMALACIA                     

 

 2014            MAN DO, FESTUS K                         733.92         
   CHONDROMALACIA                     

 

 2014            MAN DO, FESTUS K                         733.92         
   CHONDROMALACIA                     

 

 2014            MAN DO, FESTUS K                         733.92         
   CHONDROMALACIA                     

 

 2014            MAN DO, FESTUS K                         733.92         
   CHONDROMALACIA                     

 

 2014            MAN DO, FESTUS K                         733.92         
   CHONDROMALACIA                     

 

 2014            MAN DO, FESTUS K                         733.92         
   CHONDROMALACIA                     

 

 10/10/2016                         Ot            V76.12            OTH SCREEN 
MAMMO-MALIGN NEOPLASM OF NANCY                     

 

 10/10/2016                         Ot            486            PNEUMONIA, 
ORGANISM NOS                     

 

 10/10/2016                         Ot            780.79            OTH MALAISE 
FATIGUE                     

 

 10/10/2016                         Ot            719.40            JOINT PAIN-
UNSPEC                     

 

 10/10/2016                         Ot            780.79            OTH MALAISE 
FATIGUE                     

 

 10/10/2016            RELL COATES, ELIZABETH R            Ot            V76.12      
      OTH SCREEN MAMMO-MALIGN NEOPLASM OF NANCY                     

 

 10/10/2016            ELIZABETH ZACARIAS MD R            Ot            V76.12      
      OTH SCREEN MAMMO-MALIGN NEOPLASM OF NANCY                     

 

 10/11/2016                         Ot            V76.12            OTH SCREEN 
MAMMO-MALIGN NEOPLASM OF NANCY                     

 

 10/11/2016                         Ot            486            PNEUMONIA, 
ORGANISM NOS                     

 

 10/11/2016                         Ot            780.79            OTH MALAISE 
FATIGUE                     

 

 10/11/2016                         Ot            719.40            JOINT PAIN-
UNSPEC                     

 

 10/11/2016                         Ot            780.79            OTH MALAISE 
FATIGUE                     

 

 10/11/2016            ELIZABETH ZACARIAS MD R            Ot            V76.12      
      OTH SCREEN MAMMO-MALIGN NEOPLASM OF NANCY                     

 

 10/11/2016            ELIZABETH ZACARIAS MD R            Ot            V76.12      
      OTH SCREEN MAMMO-MALIGN NEOPLASM OF NANCY                     

 

 10/11/2016            ES RAMOS N APRN            Ot            Z12.31    
        ENCNTR SCREEN MAMMOGRAM FOR MALIGNANT NE                     

 

 10/12/2016            ES RAMOS APRN            Ot            Z12.31    
        ENCNTR SCREEN MAMMOGRAM FOR MALIGNANT NE                     

 

 10/25/2016            ES RAMOS APRN            Ot            Z12.31    
        ENCNTR SCREEN MAMMOGRAM FOR MALIGNANT NE                     

 

 2016                         Ot            V76.12            OTH SCREEN 
MAMMO-MALIGN NEOPLASM OF NANCY                     

 

 2016                         Ot            486            PNEUMONIA, 
ORGANISM NOS                     

 

 2016                         Ot            780.79            OTH MALAISE 
FATIGUE                     

 

 2016                         Ot            719.40            JOINT PAIN-
UNSPEC                     

 

 2016                         Ot            780.79            OTH MALAISE 
FATIGUE                     

 

 2016            RELL COATES, ELIZABETH R            Ot            V76.12      
      OTH SCREEN MAMMO-MALIGN NEOPLASM OF NANCY                     

 

 2016            RELL COATES, ELIZBAETH R            Ot            V76.12      
      OTH SCREEN MAMMO-MALIGN NEOPLASM OF NANCY                     

 

 2016            ES RAMOS N APRN            Ot            Z12.31    
        ENCNTR SCREEN MAMMOGRAM FOR MALIGNANT NE                     

 

 2016            ANETTE RIVERA MD            Ot            A08.4  
          VIRAL INTESTINAL INFECTION, UNSPECIFIED                     

 

 2016            ANETTE RIVERA MD            Ot            E03.9  
          HYPOTHYROIDISM, UNSPECIFIED                     

 

 2016            ANETTE RIVERA MD            Ot            E87.1  
          HYPO-OSMOLALITY AND HYPONATREMIA                     

 

 2016            ANETTE RIVERA MD            Ot            J18.9  
          PNEUMONIA, UNSPECIFIED ORGANISM                     

 

 2016            ANETTE RIVERA MD            Ot            J47.0  
          BRONCHIECTASIS WITH ACUTE LOWER RESPIRAT                     

 

 2016            ANETTE RIVERA MD            Ot            A08.4  
          VIRAL INTESTINAL INFECTION, UNSPECIFIED                     

 

 2016            ANETTE RIVERA MD            Ot            E03.9  
          HYPOTHYROIDISM, UNSPECIFIED                     

 

 2016            ANETTE RIVERA MD M            Ot            E87.1  
          HYPO-OSMOLALITY AND HYPONATREMIA                     

 

 2016            ANETTE RIVERA MD            Ot            J18.9  
          PNEUMONIA, UNSPECIFIED ORGANISM                     

 

 2016            ANETTE RIVERA MD M            Ot            J47.0  
          BRONCHIECTASIS WITH ACUTE LOWER RESPIRAT                     

 

 2016            ANETTE RIVERA MD            Ot            A08.4  
          VIRAL INTESTINAL INFECTION, UNSPECIFIED                     

 

 2016            ANETTE RIVERA MD            Ot            E03.9  
          HYPOTHYROIDISM, UNSPECIFIED                     

 

 2016            ANETTE RIVERA MD            Ot            E87.1  
          HYPO-OSMOLALITY AND HYPONATREMIA                     

 

 2016            ANETTE RIVERA MD            Ot            J18.9  
          PNEUMONIA, UNSPECIFIED ORGANISM                     

 

 2016            ANETTE RIVERA MD            Ot            J47.0  
          BRONCHIECTASIS WITH ACUTE LOWER RESPIRAT                     

 

 2016            MIGUEL COATES, ANETTE GREEN            Ot            A08.4  
          VIRAL INTESTINAL INFECTION, UNSPECIFIED                     

 

 2016            MIGUEL COATES, ANETTE GREEN            Ot            E03.9  
          HYPOTHYROIDISM, UNSPECIFIED                     

 

 2016            MIGUEL COATES, ANETTE GREEN            Ot            E87.1  
          HYPO-OSMOLALITY AND HYPONATREMIA                     

 

 2016            ANETTE RIVERA MD            Ot            J18.9  
          PNEUMONIA, UNSPECIFIED ORGANISM                     

 

 2016            MIGUEL COATES, ANETTE GREEN            Ot            J47.0  
          BRONCHIECTASIS WITH ACUTE LOWER RESPIRAT                     

 

 2017                         Ot            V49.81                       
           

 

 2017                         Ot            V76.12                       
           

 

 2017                         Ot            V82.81                       
           

 

 2017                         Ot            V76.12            OTH SCREEN 
MAMMO-MALIGN NEOPLASM OF NANCY                     

 

 2017                         Ot            611.89            OTHER 
SPECIFIED DISORDERS OF BREAST                     

 

 2017                         Ot            V76.12            OTH SCREEN 
MAMMO-MALIGN NEOPLASM OF NANCY                     

 

 2017                         Ot            V76.12            OTH SCREEN 
MAMMO-MALIGN NEOPLASM OF NANCY                     

 

 2017            ES RAMOS APRN            Ot            Z12.31    
        ENCNTR SCREEN MAMMOGRAM FOR MALIGNANT NE                     

 

 2017            ES RAMOS APRN            Ot            Z12.31    
        ENCNTR SCREEN MAMMOGRAM FOR MALIGNANT NE                     

 

 2017            LUIS ANTONIO KENT APRN            Ot            R06.00
            DYSPNEA, UNSPECIFIED                     

 

 2017            LUIS ANTONIO KENT APRN            Ot            R06.00
            DYSPNEA, UNSPECIFIED                     

 

 2017            LUIS ANTONIO KENT APRN            Ot            R06.00
            DYSPNEA, UNSPECIFIED                     

 

 2017            LUIS ANTONIO KENT APRN            Ot            R06.00
            DYSPNEA, UNSPECIFIED                     

 

 2017            ES RAMOS APRN            Ot            Z12.31    
        ENCNTR SCREEN MAMMOGRAM FOR MALIGNANT NE                     

 

 2017            LUIS ANTONIO KENT APRN            Ot            R06.00
            DYSPNEA, UNSPECIFIED                     

 

 2017            LUIS ANTONIO KENT APRN            Ot            G47.10
            HYPERSOMNIA, UNSPECIFIED                     

 

 2017            LUIS ANTONIO KENT APRN            Ot            G47.50
            PARASOMNIA, UNSPECIFIED                     

 

 2017            LUIS ANTONIO KENT APRN            Ot            G47.10
            HYPERSOMNIA, UNSPECIFIED                     

 

 2017            LUIS ANTONIO KENT            Ot            G47.50
            PARASOMNIA, UNSPECIFIED                     

 

 2017                         Ot            V76.12            OTH SCREEN 
MAMMO-MALIGN NEOPLASM OF NANCY                     

 

 2017                         Ot            486            PNEUMONIA, 
ORGANISM NOS                     

 

 2017                         Ot            780.79            OTH MALAISE 
FATIGUE                     

 

 2017                         Ot            719.40            JOINT PAIN-
UNSPEC                     

 

 2017                         Ot            780.79            OTH MALAISE 
FATIGUE                     

 

 2017            RELL COATES, ELIZABETH R            Ot            V76.12      
      OTH SCREEN MAMMO-MALIGN NEOPLASM OF NANCY                     

 

 2017            RELL COATES, ELIZABETH R            Ot            V76.12      
      OTH SCREEN MAMMO-MALIGN NEOPLASM OF NANCY                     

 

 2017            ES RAMOS            Ot            Z12.31    
        ENCNTR SCREEN MAMMOGRAM FOR MALIGNANT NE                     

 

 2017            LUIS ANTONIO KENT            Ot            R06.00
            DYSPNEA, UNSPECIFIED                     

 

 2017            ES RAMOS            Ot            Z12.31    
        ENCNTR SCREEN MAMMOGRAM FOR MALIGNANT NE                     

 

 2017            LUIS ANTONIO KENT            Ot            R06.00
            DYSPNEA, UNSPECIFIED                     

 

 2017            LUIS ANTONIO KENT            Ot            J47.9 
           BRONCHIECTASIS, UNCOMPLICATED                     

 

 2017            LUIS ANTONIO KENT            Ot            R06.00
            DYSPNEA, UNSPECIFIED                     

 

 2017            LUIS ANTONIO KENT            Ot            J47.9 
           BRONCHIECTASIS, UNCOMPLICATED                     

 

 2017            LUIS ANTONIO KENT            Ot            R06.00
            DYSPNEA, UNSPECIFIED                     



                                                                               
                                                                               
                                                                               
                                                                               
                                                                               
                                                                               
                                                                               
                                                                               
                                                                               
                                                                               
                                                                               
                                                                               
                                                                               
                                       



Procedures

      





 Code            Description            Performed By            Performed On   
     

 

             [id number]                                  STREP A  (IN-HOUSE)        
                           2012                                  JOINT INJECTION- LARGE 
JOINT (SPECIFY MEDCIN DESCRIPTION)                                   2013
        

 

                                               DEPO MEDROL 80 MG INJ      
                             2013        

 

             [id number]                                  JOINT INJECTION- LARGE 
JOINT (SPECIFY MEDCIN DESCRIPTION)                                   2013
        

 

                                               DEPO MEDROL 80 MG INJ      
                             2013        

 

             [id number]                                  IMMUNOTHERAPY INJECTIONS   
                                2013        

 

             [id number]                                  IMMUNOTHERAPY INJECTIONS   
                                2013        

 

             [id number]                                  IMMUNOTHERAPY INJECTIONS   
                                2013        

 

             [id number]                                  IMMUNOTHERAPY INJECTIONS   
                                2013        

 

             [id number]                                  IMMUNOTHERAPY INJECTIONS   
                                10/14/2013        

 

                                               FLU ADMINISTRATION (
MEDICARE ONLY)                                   10/23/2013        

 

             [id number]                                  IMMUNOTHERAPY INJECTIONS   
                                10/24/2013        

 

             [id number]                                  IMMUNOTHERAPY INJECTIONS   
                                10/31/2013        

 

             [id number]                                  IMMUNOTHERAPY INJECTIONS   
                                2013        

 

             64014                                  IMMUNOTHERAPY INJECTIONS   
                                2013        

 

             [id number]                                  IMMUNOTHERAPY INJECTIONS   
                                2013        

 

             [id number]                                  JOINT INJECTION- LARGE 
JOINT (SPECIFY MEDCIN DESCRIPTION)                                   2013
        

 

                                               DEPO MEDROL 80 MG INJ      
                             2013        

 

             [id number]                                  IMMUNOTHERAPY INJECTIONS   
                                2014        

 

             59935                                  IMMUNOTHERAPY INJECTIONS   
                                2014        

 

             50107                                  IMMUNOTHERAPY INJECTIONS   
                                2014        

 

             92763                                  IMMUNOTHERAPY INJECTIONS   
                                2014        

 

             [id number]                                  IMMUNOTHERAPY INJECTIONS   
                                2014        

 

             [id number]                                  IMMUNOTHERAPY INJECTIONS   
                                2014        

 

             [id number]                                  IMMUNOTHERAPY INJECTIONS   
                                2014        

 

             [id number]                                  IMMUNOTHERAPY INJECTIONS   
                                2014        

 

             [id number]                                  IMMUNOTHERAPY INJECTIONS   
                                2014        

 

             [id number]                                  IMMUNOTHERAPY INJECTIONS   
                                2014        

 

             [id number]                                  IMMUNOTHERAPY INJECTIONS   
                                2014        

 

             [id number]                                  IMMUNOTHERAPY INJECTIONS   
                                2014        

 

             [id number]                                  JOINT INJECTION- LARGE 
JOINT (SPECIFY MEDCIN DESCRIPTION)                                   2014
        

 

                                               DEXAMETHASONE SODIUM PHOS, 
1 MG                                   2014        

 

             [id number]                                  ROUTINE VENIPUNCTURE       
                            2014        

 

             [id number]                                  GFR CALC (RESULT ONLY)   
                                2014        

 

             [id number]                                  CMP                        
           2014        

 

             56311                                  LIPID PANEL                
                   2014        

 

             04665                                  TSH                        
           2014        

 

             88624                                  IMMUNOTHERAPY INJECTIONS   
                                2014        

 

             25848                                  IMMUNOTHERAPY INJECTIONS   
                                2014        

 

             [id number]                                  IMMUNOTHERAPY INJECTIONS   
                                2014        

 

             82349                                  IMMUNOTHERAPY INJECTIONS   
                                2014        

 

             23376                                  IMMUNOTHERAPY INJECTIONS   
                                2014        

 

             40992                                  IMMUNOTHERAPY INJECTIONS   
                                2014        

 

             75528                                  IMMUNOTHERAPY INJECTIONS   
                                2014        

 

             31121                                  IMMUNOTHERAPY INJECTIONS   
                                2014        

 

             [id number]                                  IMMUNOTHERAPY INJECTIONS   
                                2014        

 

             52447                                  IMMUNOTHERAPY INJECTIONS   
                                2014        

 

             60737                                  IMMUNOTHERAPY INJECTIONS   
                                2014        

 

             44107                                  IMMUNOTHERAPY INJECTIONS   
                                2014        

 

             96201                                  IMMUNOTHERAPY INJECTIONS   
                                2014        

 

             74386                                  IMMUNOTHERAPY INJECTIONS   
                                2014        

 

             53199                                  IMMUNOTHERAPY INJECTIONS   
                                2014        

 

             48598                                  IMMUNOTHERAPY INJECTIONS   
                                2014        

 

             21641                                  IMMUNOTHERAPY INJECTIONS   
                                2014        

 

             37553                                  IMMUNOTHERAPY INJECTIONS   
                                10/07/2014        

 

             54513                                  IMMUNOTHERAPY INJECTIONS   
                                10/20/2014        

 

             68219                                  IMMUNOTHERAPY INJECTIONS   
                                2014        

 

             27660                                  IMMUNOTHERAPY INJECTIONS   
                                2014        



                                                                               
                                                     



Results

      





 Test            Result            Range        









 Bacterial blood culture - 16 14:00         









 Bacterial blood culture            NG             NRG        









 Complete blood count (CBC) with automated white blood cell (WBC) differential 
- 16 14:05         









 Blood leukocytes automated count (number/volume)            5.7 10*3/uL       
     4.3-11.0        

 

 Blood erythrocytes automated count (number/volume)            4.21 10*6/uL    
        4.35-5.85        

 

 Venous blood hemoglobin measurement (mass/volume)            13.6 g/dL        
    11.5-16.0        

 

 Blood hematocrit (volume fraction)            40 %            35-52        

 

 Automated erythrocyte mean corpuscular volume            94 [foz_us]          
  80-99        

 

 Automated erythrocyte mean corpuscular hemoglobin (mass per erythrocyte)      
      32 pg            25-34        

 

 Automated erythrocyte mean corpuscular hemoglobin concentration measurement (
mass/volume)            34 g/dL            32-36        

 

 Automated erythrocyte distribution width ratio            12.8 %            
10.0-14.5        

 

 Automated blood platelet count (count/volume)            197 10*3/uL          
  130-400        

 

 Automated blood platelet mean volume measurement            10.5 [foz_us]     
       7.4-10.4        

 

 Automated blood neutrophils/100 leukocytes            89 %            42-75   
     

 

 Automated blood lymphocytes/100 leukocytes            5 %            12-44    
    

 

 Blood monocytes/100 leukocytes            5 %            0-12        

 

 Automated blood eosinophils/100 leukocytes            1 %            0-10     
   

 

 Automated blood basophils/100 leukocytes            0 %            0-10        

 

 Blood neutrophils automated count (number/volume)            5.1 10*3         
   1.8-7.8        

 

 Blood lymphocytes automated count (number/volume)            0.3 10*3         
   1.0-4.0        

 

 Blood monocytes automated count (number/volume)            0.3 10*3            
0.0-1.0        

 

 Automated eosinophil count            0.0 10*3/uL            0.0-0.3        

 

 Automated blood basophil count (count/volume)            0.0 10*3/uL          
  0.0-0.1        









 Blood lactic acid measurement (moles/volume) - 16 14:05         









 Blood lactic acid measurement (moles/volume)            1.1 mmol/L            
0.5-2.0        









 Comprehensive metabolic panel - 16 14:05         









 Serum or plasma sodium measurement (moles/volume)            129 mmol/L       
     135-145        

 

 Serum or plasma potassium measurement (moles/volume)            3.7 mmol/L    
        3.6-5.0        

 

 Serum or plasma chloride measurement (moles/volume)            101 mmol/L     
               

 

 Carbon dioxide            20 mmol/L            21-32        

 

 Serum or plasma anion gap determination (moles/volume)            8 mmol/L    
        5-14        

 

 Serum or plasma urea nitrogen measurement (mass/volume)            10 mg/dL   
         7-18        

 

 Serum or plasma creatinine measurement (mass/volume)            0.68 mg/dL    
        0.60-1.30        

 

 Serum or plasma urea nitrogen/creatinine mass ratio            15             
NRG        

 

 Serum or plasma creatinine measurement with calculation of estimated 
glomerular filtration rate            >             NRG        

 

 Serum or plasma glucose measurement (mass/volume)            128 mg/dL        
            

 

 Serum or plasma calcium measurement (mass/volume)            8.1 mg/dL        
    8.5-10.1        

 

 Serum or plasma total bilirubin measurement (mass/volume)            0.4 mg/dL
            0.1-1.0        

 

 Serum or plasma alkaline phosphatase measurement (enzymatic activity/volume)  
          64 U/L                    

 

 Serum or plasma aspartate aminotransferase measurement (enzymatic activity/
volume)            21 U/L            5-34        

 

 Serum or plasma alanine aminotransferase measurement (enzymatic activity/volume
)            18 U/L            0-55        

 

 Serum or plasma protein measurement (mass/volume)            5.7 g/dL         
   6.4-8.2        

 

 Serum or plasma albumin measurement (mass/volume)            3.5 g/dL         
   3.2-4.5        









 Blood manual differential performed detection - 16 14:05         









 Manual blood segmented neutrophils/100 leukocytes            85 %            
NRG        

 

 Blood band neutrophils/100 leukocytes            4 %            NRG        

 

 Manual blood lymphocytes/100 leukocytes            10 %            NRG        

 

 Manual eosinophils/100 leukocytes in nose            1 %            NRG        

 

 Blood erythrocyte morphology finding identification            NORMAL         
    NRG        









 Bacterial blood culture - 16 14:05         









 Bacterial blood culture            NG             NRG        









 Complete blood count (CBC) with automated white blood cell (WBC) differential 
- 16 05:46         









 Blood leukocytes automated count (number/volume)            3.1 10*3/uL       
     4.3-11.0        

 

 Blood erythrocytes automated count (number/volume)            3.94 10*6/uL    
        4.35-5.85        

 

 Venous blood hemoglobin measurement (mass/volume)            12.8 g/dL        
    11.5-16.0        

 

 Blood hematocrit (volume fraction)            38 %            35-52        

 

 Automated erythrocyte mean corpuscular volume            95 [foz_us]          
  80-99        

 

 Automated erythrocyte mean corpuscular hemoglobin (mass per erythrocyte)      
      33 pg            25-34        

 

 Automated erythrocyte mean corpuscular hemoglobin concentration measurement (
mass/volume)            34 g/dL            32-36        

 

 Automated erythrocyte distribution width ratio            12.8 %            
10.0-14.5        

 

 Automated blood platelet count (count/volume)            177 10*3/uL          
  130-400        

 

 Automated blood platelet mean volume measurement            10.0 [foz_us]     
       7.4-10.4        

 

 Automated blood neutrophils/100 leukocytes            64 %            42-75   
     

 

 Automated blood lymphocytes/100 leukocytes            19 %            12-44   
     

 

 Blood monocytes/100 leukocytes            11 %            0-12        

 

 Automated blood eosinophils/100 leukocytes            6 %            0-10     
   

 

 Automated blood basophils/100 leukocytes            1 %            0-10        

 

 Blood neutrophils automated count (number/volume)            2.0 10*3         
   1.8-7.8        

 

 Blood lymphocytes automated count (number/volume)            0.6 10*3         
   1.0-4.0        

 

 Blood monocytes automated count (number/volume)            0.4 10*3            
0.0-1.0        

 

 Automated eosinophil count            0.2 10*3/uL            0.0-0.3        

 

 Automated blood basophil count (count/volume)            0.0 10*3/uL          
  0.0-0.1        









 Comprehensive metabolic panel - 16 05:46         









 Serum or plasma sodium measurement (moles/volume)            133 mmol/L       
     135-145        

 

 Serum or plasma potassium measurement (moles/volume)            4.7 mmol/L    
        3.6-5.0        

 

 Serum or plasma chloride measurement (moles/volume)            105 mmol/L     
               

 

 Carbon dioxide            21 mmol/L            21-32        

 

 Serum or plasma anion gap determination (moles/volume)            7 mmol/L    
        5-14        

 

 Serum or plasma urea nitrogen measurement (mass/volume)            10 mg/dL   
         7-18        

 

 Serum or plasma creatinine measurement (mass/volume)            0.70 mg/dL    
        0.60-1.30        

 

 Serum or plasma urea nitrogen/creatinine mass ratio            14             
NRG        

 

 Serum or plasma creatinine measurement with calculation of estimated 
glomerular filtration rate            >             NRG        

 

 Serum or plasma glucose measurement (mass/volume)            92 mg/dL         
           

 

 Serum or plasma calcium measurement (mass/volume)            8.1 mg/dL        
    8.5-10.1        

 

 Serum or plasma total bilirubin measurement (mass/volume)            0.4 mg/dL
            0.1-1.0        

 

 Serum or plasma alkaline phosphatase measurement (enzymatic activity/volume)  
          60 U/L                    

 

 Serum or plasma aspartate aminotransferase measurement (enzymatic activity/
volume)            23 U/L            5-34        

 

 Serum or plasma alanine aminotransferase measurement (enzymatic activity/volume
)            18 U/L            0-55        

 

 Serum or plasma protein measurement (mass/volume)            4.9 g/dL         
   6.4-8.2        

 

 Serum or plasma albumin measurement (mass/volume)            3.1 g/dL         
   3.2-4.5        









 Complete urinalysis with reflex to culture - 16 08:15         









 Urine color determination            YELLOW             NRG        

 

 Urine clarity determination            CLEAR             NRG        

 

 Urine pH measurement by test strip            6.5             5-9        

 

 Specific gravity of urine by test strip            1.005             1.016-
1.022        

 

 Urine protein assay by test strip, semi-quantitative            NEGATIVE      
       NEGATIVE        

 

 Urine glucose detection by automated test strip            NEGATIVE           
  NEGATIVE        

 

 Erythrocytes detection in urine sediment by light microscopy            
NEGATIVE             NEGATIVE        

 

 Urine ketones detection by automated test strip            NEGATIVE           
  NEGATIVE        

 

 Urine nitrite detection by test strip            NEGATIVE             NEGATIVE
        

 

 Urine total bilirubin detection by test strip            NEGATIVE             
NEGATIVE        

 

 Urine urobilinogen measurement by automated test strip (mass/volume)          
  NORMAL             NORMAL        

 

 Urine leukocyte esterase detection by dipstick            NEGATIVE             
NEGATIVE        

 

 Automated urine sediment erythrocyte count by microscopy (number/high power 
field)            RARE             NRG        

 

 Automated urine sediment leukocyte count by microscopy (number/high power field
)            NONE             NRG        

 

 Bacteria detection in urine sediment by light microscopy            NEGATIVE  
           NRG        

 

 Squamous epithelial cells detection in urine sediment by light microscopy     
       NONE             NRG        

 

 Crystals detection in urine sediment by light microscopy            NONE      
       NRG        

 

 Casts detection in urine sediment by light microscopy            NONE         
    NRG        

 

 Mucus detection in urine sediment by light microscopy            NEGATIVE     
        NRG        

 

 Complete urinalysis with reflex to culture            NO             NRG      
  









 Serum or plasma lithium measurement (moles/volume) - 16 09:40         









 BNP level            55.6 pg/mL            <100.0        









 Influenza virus A and B antigen detection - 16 09:21         









 FLU RESULT            NEGATIVE FOR INFLUENZA A AND B ANTIGENS BY IA           
  NRG        









 Complete blood count (CBC) with automated white blood cell (WBC) differential 
- 16 09:45         









 Blood leukocytes automated count (number/volume)            4.2 10*3/uL       
     4.3-11.0        

 

 Blood erythrocytes automated count (number/volume)            4.43 10*6/uL    
        4.35-5.85        

 

 Venous blood hemoglobin measurement (mass/volume)            14.2 g/dL        
    11.5-16.0        

 

 Blood hematocrit (volume fraction)            42 %            35-52        

 

 Automated erythrocyte mean corpuscular volume            94 [foz_us]          
  80-99        

 

 Automated erythrocyte mean corpuscular hemoglobin (mass per erythrocyte)      
      32 pg            25-34        

 

 Automated erythrocyte mean corpuscular hemoglobin concentration measurement (
mass/volume)            34 g/dL            32-36        

 

 Automated erythrocyte distribution width ratio            12.7 %            
10.0-14.5        

 

 Automated blood platelet count (count/volume)            192 10*3/uL          
  130-400        

 

 Automated blood platelet mean volume measurement            9.9 [foz_us]      
      7.4-10.4        

 

 Automated blood neutrophils/100 leukocytes            50 %            42-75   
     

 

 Automated blood lymphocytes/100 leukocytes            29 %            12-44   
     

 

 Blood monocytes/100 leukocytes            15 %            0-12        

 

 Automated blood eosinophils/100 leukocytes            6 %            0-10     
   

 

 Automated blood basophils/100 leukocytes            1 %            0-10        

 

 Blood neutrophils automated count (number/volume)            2.1 10*3         
   1.8-7.8        

 

 Blood lymphocytes automated count (number/volume)            1.2 10*3         
   1.0-4.0        

 

 Blood monocytes automated count (number/volume)            0.6 10*3            
0.0-1.0        

 

 Automated eosinophil count            0.3 10*3/uL            0.0-0.3        

 

 Automated blood basophil count (count/volume)            0.0 10*3/uL          
  0.0-0.1        









 Comprehensive metabolic panel - 16 09:45         









 Serum or plasma sodium measurement (moles/volume)            136 mmol/L       
     135-145        

 

 Serum or plasma potassium measurement (moles/volume)            3.8 mmol/L    
        3.6-5.0        

 

 Serum or plasma chloride measurement (moles/volume)            104 mmol/L     
               

 

 Carbon dioxide            22 mmol/L            21-32        

 

 Serum or plasma anion gap determination (moles/volume)            10 mmol/L   
         5-14        

 

 Serum or plasma urea nitrogen measurement (mass/volume)            4 mg/dL    
        7-18        

 

 Serum or plasma creatinine measurement (mass/volume)            0.70 mg/dL    
        0.60-1.30        

 

 Serum or plasma urea nitrogen/creatinine mass ratio            6             
NRG        

 

 Serum or plasma creatinine measurement with calculation of estimated 
glomerular filtration rate            >             NRG        

 

 Serum or plasma glucose measurement (mass/volume)            93 mg/dL         
           

 

 Serum or plasma calcium measurement (mass/volume)            8.6 mg/dL        
    8.5-10.1        

 

 Serum or plasma total bilirubin measurement (mass/volume)            0.2 mg/dL
            0.1-1.0        

 

 Serum or plasma alkaline phosphatase measurement (enzymatic activity/volume)  
          71 U/L                    

 

 Serum or plasma aspartate aminotransferase measurement (enzymatic activity/
volume)            29 U/L            5-34        

 

 Serum or plasma alanine aminotransferase measurement (enzymatic activity/volume
)            20 U/L            0-55        

 

 Serum or plasma protein measurement (mass/volume)            5.9 g/dL         
   6.4-8.2        

 

 Serum or plasma albumin measurement (mass/volume)            3.6 g/dL         
   3.2-4.5        









 Complete blood count (CBC) with automated white blood cell (WBC) differential 
- 16 05:00         









 Blood leukocytes automated count (number/volume)            6.2 10*3/uL       
     4.3-11.0        

 

 Blood erythrocytes automated count (number/volume)            4.39 10*6/uL    
        4.35-5.85        

 

 Venous blood hemoglobin measurement (mass/volume)            14.1 g/dL        
    11.5-16.0        

 

 Blood hematocrit (volume fraction)            42 %            35-52        

 

 Automated erythrocyte mean corpuscular volume            95 [foz_us]          
  80-99        

 

 Automated erythrocyte mean corpuscular hemoglobin (mass per erythrocyte)      
      32 pg            25-34        

 

 Automated erythrocyte mean corpuscular hemoglobin concentration measurement (
mass/volume)            34 g/dL            32-36        

 

 Automated erythrocyte distribution width ratio            12.7 %            
10.0-14.5        

 

 Automated blood platelet count (count/volume)            218 10*3/uL          
  130-400        

 

 Automated blood platelet mean volume measurement            11.1 [foz_us]     
       7.4-10.4        

 

 Automated blood neutrophils/100 leukocytes            57 %            42-75   
     

 

 Automated blood lymphocytes/100 leukocytes            23 %            12-44   
     

 

 Blood monocytes/100 leukocytes            12 %            0-12        

 

 Automated blood eosinophils/100 leukocytes            7 %            0-10     
   

 

 Automated blood basophils/100 leukocytes            1 %            0-10        

 

 Blood neutrophils automated count (number/volume)            3.5 10*3         
   1.8-7.8        

 

 Blood lymphocytes automated count (number/volume)            1.5 10*3         
   1.0-4.0        

 

 Blood monocytes automated count (number/volume)            0.7 10*3            
0.0-1.0        

 

 Automated eosinophil count            0.4 10*3/uL            0.0-0.3        

 

 Automated blood basophil count (count/volume)            0.0 10*3/uL          
  0.0-0.1        









 Comprehensive metabolic panel - 16 05:00         









 Serum or plasma sodium measurement (moles/volume)            136 mmol/L       
     135-145        

 

 Serum or plasma potassium measurement (moles/volume)            4.4 mmol/L    
        3.6-5.0        

 

 Serum or plasma chloride measurement (moles/volume)            103 mmol/L     
               

 

 Carbon dioxide            24 mmol/L            21-32        

 

 Serum or plasma anion gap determination (moles/volume)            9 mmol/L    
        5-14        

 

 Serum or plasma urea nitrogen measurement (mass/volume)            5 mg/dL    
        7-18        

 

 Serum or plasma creatinine measurement (mass/volume)            0.70 mg/dL    
        0.60-1.30        

 

 Serum or plasma urea nitrogen/creatinine mass ratio            7             
NRG        

 

 Serum or plasma creatinine measurement with calculation of estimated 
glomerular filtration rate            >             NRG        

 

 Serum or plasma glucose measurement (mass/volume)            88 mg/dL         
           

 

 Serum or plasma calcium measurement (mass/volume)            8.8 mg/dL        
    8.5-10.1        

 

 Serum or plasma total bilirubin measurement (mass/volume)            0.3 mg/dL
            0.1-1.0        

 

 Serum or plasma alkaline phosphatase measurement (enzymatic activity/volume)  
          69 U/L                    

 

 Serum or plasma aspartate aminotransferase measurement (enzymatic activity/
volume)            28 U/L            5-34        

 

 Serum or plasma alanine aminotransferase measurement (enzymatic activity/volume
)            21 U/L            0-55        

 

 Serum or plasma protein measurement (mass/volume)            5.9 g/dL         
   6.4-8.2        

 

 Serum or plasma albumin measurement (mass/volume)            3.7 g/dL         
   3.2-4.5        



                                            



Encounters

      





 ACCT No.            Visit Date/Time            Discharge            Status    
        Pt. Type            Provider            Facility            Loc./Unit  
          Complaint        

 

 [id number]            2017 15:29:00            2017 23:59:59    
        CLS            Outpatient            LUIS ANTONIO KENT          
  [facility]            RT            R06.00        

 

 [id number]            2017 19:55:00            2017 06:05:00    
        DIS            Outpatient            LUIS ANTONIO KENT          
  [facility]            SLEEP            HYPERSOMNIA,
PARASOMNIA,SLEEP DISORDER        

 

 [id number]            2017 13:38:00            2017 23:59:59    
        CLS            Outpatient            LUIS ANTONIO KENT          
  [facility]            RAD            DYSPNEA        

 

 [id number]            2016 09:30:00            2016 12:20:00    
        DIS            Inpatient            ANETTE RIVERA MD            
[facility]            4TH            GASTROENTERITIS,
WEAKNESS        

 

 [id number]            10/10/2016 14:58:00            10/10/2016 23:59:59    
        CLS            Outpatient            ES RAMOS            
[facility]            RAD            SCREENING        

 

 [id number]            2014 10:04:00            2014 23:59:59    
        CLS            Outpatient            ELIZABETH ZACARIAS MD            Via 
[facility]            RAD            SCREENING        

 

 [id number]            2013 14:33:00            2013 23:59:59    
        CLS            Outpatient            ELIZABETH ZACARIAS MD            Via 
[facility]            RAD            MAMMO        

 

 [id number]            2018 10:32:00                         ACT       
     Emergency            MATTEO WHITE MD            [facility]            ER            SYNCOPE/CONGESTION        

 

 [id number]            2017 15:15:00                                   
   Document Registration                                                       
     

 

 [id number]            2017 15:15:00                                   
   Document Registration                                                       
     

 

 [id number]            2012 14:27:00                                   
   Document Registration                                                       
     

 

 [id number]            2012 10:41:00                                   
   Document Registration                                                       
     

 

 [id number]            2012 16:31:00                                   
   Document Registration                                                       
     

 

 [id number]            2012 16:35:00                                   
   Document Registration                                                       
     

 

 [id number]            2012 11:59:00                                   
   Document Registration                                                       
     

 

 [id number]            2012 13:09:00                                   
   Document Registration                                                       
     

 

 [id number]            2011 15:19:00                                   
   Document Registration                                                       
     

 

 [id number]            2009 15:17:00                                   
   Document Registration                                                       
     

 

 [id number]            10/21/2008 07:54:00                                   
   Document Registration                                                       
     

 

 [id number]            2014 08:59:00            2014 23:59:59          
  CLS            Outpatient            FESTUS MAN DO                        
                       

 

 [id number]            2014 10:10:00            2014 23:59:59          
  CLS            Outpatient            FESTUS MAN DO                        
                       

 

 [id number]            10/20/2014 11:44:00            10/20/2014 23:59:59          
  CLS            Outpatient            FESTUS MAN DO                        
                       

 

 [id number]            10/07/2014 12:51:00            10/07/2014 23:59:59          
  CLS            Outpatient            FESTUS MAN DO                        
                       

 

 [id number]            2014 09:51:00            2014 23:59:59          
  CLS            Outpatient            FESTUS MAN DO                        
                       

 

 [id number]            2014 09:12:00            2014 23:59:59          
  CLS            Outpatient            MAN DO, FESTUS PAEZ                        
                       

 

 [id number]            2014 11:58:00            2014 23:59:59          
  CLS            Outpatient            MAN DO, FESTUS PAEZ                        
                       

 

 [id number]            2014 10:29:00            2014 23:59:59          
  CLS            Outpatient            MAN DO, FESTUS PAEZ                        
                       

 

 [id number]            2014 13:32:00            2014 23:59:59          
  CLS            Outpatient            MAN DO, FESTUS PAEZ                        
                       

 

 [id number]            2014 12:56:00            2014 23:59:59          
  CLS            Outpatient            MAN DO, FESTUS PAEZ                        
                       

 

 [id number]            2014 10:25:00            2014 23:59:59          
  CLS            Outpatient            MAN DO, FESTUS PAEZ                        
                       

 

 [id number]            2014 09:07:00            2014 23:59:59          
  CLS            Outpatient            MAN DO, FESTUS PAEZ                        
                       

 

 [id number]            2014 09:59:00            2014 23:59:59          
  CLS            Outpatient            MAN DO, FESTUS PAEZ                        
                       

 

 [id number]            2014 15:07:00            2014 23:59:59          
  CLS            Outpatient            MAN DO, FESTUS PAEZ                        
                       

 

 [id number]            2014 08:29:00            2014 23:59:59          
  CLS            Outpatient            MAN DO, FESTUS PAEZ                        
                       

 

 [id number]            2014 11:07:00            2014 23:59:59          
  CLS            Outpatient            MAN DO, FESTUS PAEZ                        
                       

 

 [id number]            2014 11:38:00            2014 23:59:59          
  CLS            Outpatient            MAN DO, FESTUS PAEZ                        
                       

 

 [id number]            2014 13:49:00            2014 23:59:59          
  CLS            Outpatient            MAN DO, FESTUS PAEZ                        
                       

 

 [id number]            2014 13:12:00            2014 23:59:59          
  CLS            Outpatient            MAN DO, FESTUS PAEZ                        
                       

 

 [id number]            2014 11:36:00            2014 23:59:59          
  CLS            Outpatient            MAN DO, FESTUS PAEZ                        
                       

 

 [id number]            2014 10:06:00            2014 23:59:59          
  CLS            Outpatient            MAN DO, FESTUS PAEZ                        
                       

 

 [id number]            2014 15:10:00            2014 23:59:59          
  CLS            Outpatient            MAN DO, FESTUS PAEZ                        
                       

 

 [id number]            2014 15:10:00            2014 23:59:59          
  CLS            Outpatient            MAN DO, FESTUS PAEZ                        
                       

 

 [id number]            2014 14:11:00            2014 23:59:59          
  CLS            Outpatient            MAN DO, FESTUS PAEZ                        
                       

 

 [id number]            2014 10:11:00            2014 23:59:59          
  CLS            Outpatient            MAN DO, FESTUS PAEZ                        
                       

 

 [id number]            2014 11:21:00            2014 23:59:59          
  CLS            Outpatient            MAN DO, FESTUS PAEZ                        
                       

 

 [id number]            2014 13:23:00            2014 23:59:59          
  CLS            Outpatient            MAN DO, FESTUS PAEZ                        
                       

 

 [id number]            2014 13:26:00            2014 23:59:59          
  CLS            Outpatient            MAN DO, FESTUS PAEZ                        
                       

 

 [id number]            2014 11:27:00            2014 23:59:59          
  CLS            Outpatient            MAN DO, FESTUS PAEZ                        
                       

 

 [id number]            2014 14:14:00            2014 23:59:59          
  CLS            Outpatient            MAN DO, FESTUS PAEZ                        
                       

 

 [id number]            2014 12:56:00            2014 23:59:59          
  CLS            Outpatient            MAN DO, FESTUS PAEZ                        
                       

 

 [id number]            03/10/2014 07:36:00            03/10/2014 23:59:59          
  CLS            Outpatient            MAN DO, FESTUS PAEZ                        
                       

 

 [id number]            2014 11:52:00            2014 23:59:59          
  CLS            Outpatient            MAN DO, FESTUS PAEZ                        
                       

 

 [id number]            2014 10:40:00            2014 23:59:59          
  CLS            Outpatient            MAN DO, FESTUS PAEZ                        
                       

 

 [id number]            2014 13:30:00            2014 23:59:59          
  CLS            Outpatient            MAN DO, FESTUS PAEZ                        
                       

 

 [id number]            2014 10:51:00            2014 23:59:59          
  CLS            Outpatient            MAN DO, FESTUS PAEZ                        
                       

 

 [id number]            2013 11:33:00            2013 23:59:59          
  CLS            Outpatient            MAN DO, FESTUS PAEZ                        
                       

 

 [id number]            2013 11:50:00            2013 23:59:59          
  CLS            Outpatient            DOONVAN DOFESTUS                        
                       

 

 [id number]            2013 08:52:00            2013 23:59:59          
  CLS            Outpatient            DONOVAN DOFESTUS                        
                       

 

 [id number]            10/31/2013 13:03:00            10/31/2013 23:59:59          
  CLS            Outpatient            DONOVAN DOFESTUS                        
                       

 

 [id number]            10/24/2013 14:47:00            10/24/2013 23:59:59          
  CLS            Outpatient            DONOVAN DOFESTUS                        
                       

 

 [id number]            10/14/2013 10:28:00            10/14/2013 23:59:59          
  CLS            Outpatient            DONOVAN DOFESTUS                        
                       

 

 [id number]            2013 13:36:00            2013 23:59:59          
  CLS            Outpatient            MAN DOFESTUS                        
                       

 

 [id number]            2013 14:54:00            2013 23:59:59          
  CLS            Outpatient                                                    
        

 

 [id number]            2012 14:09:00            2012 23:59:59          
  CLS            Outpatient                                                    
        

 

 [id number]            2013 16:16:00                                      
Document Registration                                                          
  

 

 [id number]            2013 10:36:00                                      
Document Registration                                                          
  

 

 [id number]            2013 08:45:00                                      
Document Registration                                                          
  

 

 [id number]            2013 12:54:00                                      
Document Registration                                                          
  

 

 [id number]            2013 12:09:00                                      
Document Registration

## 2018-01-14 NOTE — XMS REPORT
[facility]

 Created on: 09/10/2017



Carie Martinez

External Reference #: [id number]

: 1940

Sex: Female



Demographics







 Address  [address] [place]  [address]

 

 Preferred Language  Unknown

 

 Marital Status  Unknown

 

 Amish Affiliation  Unknown

 

 Race  Unknown

 

 Ethnic Group  Unknown





Author







 Author  NORBERTO ANNA

 

 Organization  [facility]

 

 Address  [address] [place]  [address]



 

 Phone  [phone]







Care Team Providers







 Care Team Member Name  Role  Phone

 

 NORBERTO ANNA  Unavailable  [phone]







PROBLEMS







 Type  Condition  ICD9-CM Code  CJJ27-AG Code  Onset Dates  Condition Status  
SNOMED Code

 

 Problem  Chondromalacia  733.92        Active  57689580

 

 Problem  Acute upper respiratory infections of unspecified site  465.9        
Active  16200200

 

 Problem  Pneumonia, organism unspecified  486        Active  743202610

 

 Problem  ZOSTAVAX DX  V05.8        Active  12742674

 

 Problem  Need for prophylactic vaccination and inoculation, Influenza  V04.81 
       Active  962744490

 

 Problem  Cough  786.2        Active  84908727

 

 Problem  Allergic rhinitis, unspecified allergic rhinitis type     J30.9     
Active  18925471

 

 Problem  Urinary incontinence in female     R32     Active  954620834

 

 Problem  Allergic rhinitis     J30.9     Active  83570064

 

 Problem  Allergic rhinitis, cause unspecified  477.9        Active  02247112

 

 Problem  Hearing difficulty of left ear     H91.92     Active  675332850

 

 Problem  Memory loss     R41.3     Active  19198029







ALLERGIES







 Substance  Reaction  Event Type  Date  Status

 

 cat/dog dander/tree/dust  Unknown  Non Drug Allergy    Active







SOCIAL HISTORY

No smoking Hx information available



PLAN OF CARE







 Activity  Details









  









 Follow Up  prn Reason:







VITAL SIGNS







 Height  63 in  2017

 

 Weight  129.2 lbs  2017

 

 Temperature  98.7 degrees Fahrenheit  2017

 

 Heart Rate  82 bpm  2017

 

 Respiratory Rate  18   2017

 

 Oximetry  on room air:99 %  2017

 

 BMI  22.88 kg/m2  2017

 

 Blood pressure systolic  128 mmHg  2017

 

 Blood pressure diastolic  82 mmHg  2017







MEDICATIONS







 Medication  Instructions  Dosage  Frequency  Start Date  End Date  Duration  
Status

 

 Doxycycline Hyclate 100 MG  Orally every 12 hrs  1 capsule  12h    7 days  Active

 

 Vitamin B Complex                    Active

 

 Centrum Silver           21 Aug, 2012        Active

 

 levothyroxine 25 mcg     take 1 tablet (25 mcg) by oral route once daily     
21 Aug, 2012        Active

 

 Vitamin C 500 MG                    Active

 

 Fish Oil 1000 MG  Orally Once a day  1 capsule  24h           Active

 

 Benzonatate 100 MG  Orally Three times a day  1 capsule  8h    2 
2017  7 days  Active







RESULTS

No Results



PROCEDURES







 Procedure  Date Ordered  Related Diagnosis  Body Site

 

 MEASURE BLOOD OXYGEN LEVEL  2017      

 

 [facility] VISIT ESTABLISHED PATIENT  2017      

 

 Office Visit, Est Pt., Level 3  2017      







IMMUNIZATIONS

No Known Immunizations

## 2018-01-14 NOTE — XMS REPORT
[facility]

 Created on: 2017



Carie Martinez

External Reference #: [id number]

: 1940

Sex: Female



Demographics







 Address  [address] [place]  [address]

 

 Preferred Language  Unknown

 

 Marital Status  Unknown

 

 Congregational Affiliation  Unknown

 

 Race  Unknown

 

 Ethnic Group  Unknown





Author







 Author  GOLD MILLER

 

 [facility]

 

 Address  [address] [place]  [address]



 

 Phone  [phone]







Care Team Providers







 Care Team Member Name  Role  Phone

 

 PAUL  GOLD  Unavailable  [phone]







PROBLEMS







 Type  Condition  ICD9-CM Code  NXW30-IG Code  Onset Dates  Condition Status  
SNOMED Code

 

 Assessment  Allergic rhinitis, unspecified allergic rhinitis type     J30.9  
05 Dec, 2016  Active  71454462

 

 Problem  Need for prophylactic vaccination and inoculation, Influenza  V04.81 
       Active  934605766

 

 Problem  Cough  786.2        Active  29993879

 

 Problem  Allergic rhinitis     J30.9     Active  65258546

 

 Problem  Chondromalacia  733.92        Active  66894831

 

 Problem  Acute upper respiratory infections of unspecified site  465.9        
Active  24065457

 

 Problem  Pneumonia, organism unspecified  486        Active  220589208

 

 Problem  Allergic rhinitis, cause unspecified  477.9        Active  75886467

 

 Problem  ZOSTAVAX DX  V05.8        Active  42305913







ALLERGIES

Unknown Allergies



SOCIAL HISTORY

No smoking Hx information available



PLAN OF CARE





VITAL SIGNS





MEDICATIONS

Unknown Medications



RESULTS

No Results



PROCEDURES







 Procedure  Date Ordered  Related Diagnosis  Body Site

 

 IMMUNOTHERAPY INJECTIONS  Dec 05, 2016      







IMMUNIZATIONS

No Known Immunizations

## 2018-01-14 NOTE — XMS REPORT
[facility]

 Created on: 2016



Carie Martinez

External Reference #: [id number]

: 1940

Sex: Female



Demographics







 Address  [address] [place]  [address]

 

 Home Phone  [phone]

 

 Preferred Language  Unknown

 

 Marital Status  Unknown

 

 Anabaptist Affiliation  Unknown

 

 Race  White

 

 Ethnic Group  Not  or 





Author







 GOLD Hay

 

 Organization  [employer]

 

 Address  Unknown

 

 Phone  Unavailable







Care Team Providers







 Care Team Member Name  Role  Phone

 

 GOLD MILLER  CP  Unavailable



                                                                



Allergies, Adverse Reactions, Alerts

          





 Substance  Reaction  Event Type

 

 N.K.D.A.  Info Not Available  Non Drug Allergy



                                                                               
         



Problems

          





 Problem Type  Condition  Code  Onset Dates  Condition Status

 

 Assessment  Allergic rhinitis  J30.9     Active

 

 Problem  Allergic rhinitis, cause unspecified  477.9     Active

 

 Problem  ZOSTAVAX DX  V05.8     Active

 

 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

 

 Problem  Need for prophylactic vaccination and inoculation, Influenza  V04.81 
    Active

 

 Problem  Cough  786.2     Active

 

 Problem  Acute upper respiratory infections of unspecified site  465.9     
Active

 

 Problem  Pneumonia, organism unspecified  486     Active



                                                                               
                                                                               



Medications

          





 Medication  Code System  Code  Instructions  Start Date  End Date  Status  
Dosage

 

 Vitamin B Complex  NDC  37446-99319   Orally            not defined

 

 Flonase Allergy Relief  NDC  [address]-89  50 MCG/ACT Nasally Once a day     
      1 spray in each nostril

 

 Centrum Silver  NDC  [address]-07      Aug 21, 2012        not defined

 

 Vitamin C  NDC  89706-91515  500 MG Orally            not defined

 

 PredniSONE  NDC  [address]-60  20 mg Orally Once a day  2016  May 01
, 2016     2 tablets

 

 Zyrtec Allergy  NDC  [address]-31  10 MG Orally Once a day           1 tablet 
as needed

 

 Fish Oil  NDC  [address]-34  1000 MG Orally Once a day           1 capsule

 

 levothyroxine  NDC  0  25 mcg    Aug 21, 2012        take 1 tablet (25 mcg) by 
oral route once daily



                                                                               
                                                                               



Procedures

          





 Procedure  Coding System  Code  Date

 

 Office Visit, Est Pt., Level 3  CPT-4  34106  2016

 

 [facility] VISIT ESTABLISHED PATIENT  CPT-4    2016



                                                                               
                             



Vital Signs

          





 Date/Time:  2016

 

 Temperature  98.0 F

 

 Weight  132.2 lbs

 

 Height  63 in

 

 BMI  23.42 Index

 

 Blood Pressure Diastolic  72 mmHg

 

 Blood Pressure Systolic  112 mmHg

 

 Cardiac Monitoring Heart Rate  80 bpm



                                                                              



Results

          No Known Results                                                     
               



Summary Purpose

          [employer] Submission

## 2018-01-14 NOTE — XMS REPORT
[facility]

 Created on: 2017



Carie Martinez

External Reference #: [id number]

: 1940

Sex: Female



Demographics







 Address  [address] [place]  [address]

 

 Preferred Language  Unknown

 

 Marital Status  Unknown

 

 Congregation Affiliation  Unknown

 

 Race  Unknown

 

 Ethnic Group  Unknown





Author







 Author  HENRY GONZALZE

 

 Organization  [facility]

 

 Address  [address] [place]  [address]



 

 Phone  [phone]







Care Team Providers







 Care Team Member Name  Role  Phone

 

 ALOK GONZALEZA  Unavailable  [phone]







PROBLEMS







 Type  Condition  ICD9-CM Code  ATS43-FY Code  Onset Dates  Condition Status  
SNOMED Code

 

 Problem  Acute upper respiratory infections of unspecified site  465.9        
Active  37208321

 

 Problem  Allergic rhinitis, cause unspecified  477.9        Active  92813305

 

 Problem  ZOSTAVAX DX  V05.8        Active  97434629

 

 Problem  Cough  786.2        Active  40546991

 

 Problem  Need for prophylactic vaccination and inoculation, Influenza  V04.81 
       Active  096110110

 

 Problem  Pneumonia, organism unspecified  486        Active  328804273

 

 Problem  Allergic rhinitis, unspecified allergic rhinitis type     J30.9     
Active  59842950

 

 Problem  Memory loss     R41.3     Active  43072843

 

 Problem  Allergic rhinitis     J30.9     Active  67937587

 

 Problem  Chondromalacia  733.92        Active  28231556

 

 Problem  Hearing difficulty of left ear     H91.92     Active  132063080

 

 Problem  Urinary incontinence in female     R32     Active  482790516







ALLERGIES







 Substance  Reaction  Event Type  Date  Status

 

 cat/dog dander/tree/dust  Unknown  Non Drug Allergy  15 Dec, 2016  Active







SOCIAL HISTORY

No smoking Hx information available



PLAN OF CARE







 Activity  Details









  









 Follow Up  prn Reason:dee dee







VITAL SIGNS







 Blood pressure systolic  124 mmHg  2016-12-15

 

 Blood pressure diastolic  70 mmHg  2016-12-15







MEDICATIONS







 Medication  Instructions  Dosage  Frequency  Start Date  End Date  Duration  
Status

 

 Zyrtec Allergy 10 MG  Orally Once a day  1 tablet as needed  24h           
Active

 

 Vitamin B Complex                    Active

 

 Fish Oil 1000 MG  Orally Once a day  1 capsule  24h           Active

 

 Centrum Silver           21 Aug, 2012        Active

 

 levothyroxine 25 mcg     take 1 tablet (25 mcg) by oral route once daily     
21 Aug, 2012        Active

 

 Vitamin C 500 MG                    Active







RESULTS

No Results



PROCEDURES







 Procedure  Date Ordered  Related Diagnosis  Body Site

 

 BITEWINGS - FOUR FILMS  Dec 15, 2016      

 

 Periodontal maint procedures  Dec 15, 2016      

 

 Billing Notes on claim  Dec 15, 2016      

 

 TOPICAL FLUORIDE VARNISH  Dec 15, 2016      

 

 [employer] Employee/Board adjustment  Dec 15, 2016      







IMMUNIZATIONS

No Known Immunizations

## 2018-01-14 NOTE — XMS REPORT
[facility]

 Created on: 2017



Carie Martinez

External Reference #: [id number]

: 1940

Sex: Female



Demographics







 Address  [address] [place]  [address]

 

 Preferred Language  Unknown

 

 Marital Status  Unknown

 

 Zoroastrian Affiliation  Unknown

 

 Race  Unknown

 

 Ethnic Group  Unknown





Author







 Author  NORBERTO ANNA

 

 [facility]

 

 Address  [address] [place]  [address]



 

 Phone  [phone]







Care Team Providers







 Care Team Member Name  Role  Phone

 

 NORBERTO ANNA  Unavailable  [phone]







PROBLEMS







 Type  Condition  ICD9-CM Code  UZR37-CA Code  Onset Dates  Condition Status  
SNOMED Code

 

 Problem  Allergic rhinitis     J30.9     Active  82524192

 

 Problem  Acquired hypothyroidism     E03.9     Active  910938487

 

 Problem  Hearing difficulty of left ear     H91.92     Active  011493127

 

 Problem  Allergic rhinitis, unspecified allergic rhinitis type     J30.9     
Active  47759633

 

 Problem  Urinary incontinence in female     R32     Active  369112643







ALLERGIES

No Information



SOCIAL HISTORY

Never Assessed



PLAN OF CARE





VITAL SIGNS





MEDICATIONS

Unknown Medications



RESULTS

No Results



PROCEDURES







 Procedure  Date Ordered  Result  Body Site

 

 IMMUNOTHERAPY INJECTIONS  May 22, 2017      







IMMUNIZATIONS

No Known Immunizations



MEDICAL (GENERAL) HISTORY







 Type  Description  Date

 

 Medical History  thyroid   

 

 Medical History  allergies   

 

 Medical History  hot flashes   

 

 Medical History  ruptured ovarian cyst   

 

 Surgical History  tonsillectomy   

 

 Surgical History  hysterectomy  1987

 

 Surgical History  ovarian cyst   

 

 Hospitalization History  surgeries   

 

 Hospitalization History  pneumonia and dehydration  2016

## 2018-01-14 NOTE — XMS REPORT
[facility]

 Created on: 2016



Carie Martinez

External Reference #: [id number]

: 1940

Sex: Female



Demographics







 Address  [address] [place]  [address]

 

 Home Phone  [phone]

 

 Preferred Language  Unknown

 

 Marital Status  Unknown

 

 Episcopal Affiliation  Unknown

 

 Race  White

 

 Ethnic Group  Not  or 





Author







 Author  GOLD MILLER

 

 Organization  [employer]

 

 Address  Unknown

 

 Phone  Unavailable







Care Team Providers







 Care Team Member Name  Role  Phone

 

 GOLD MILLER  CP  Unavailable



                                                                



Allergies

          No Known Allergies                                                   
                                     



Problems

          





 Problem Type  Condition  Code  Onset Dates  Condition Status

 

 Problem  Cough  786.2     Active

 

 Assessment  Allergic rhinitis  J30.9     Active

 

 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

 

 Problem  Allergic rhinitis, cause unspecified  477.9     Active

 

 Problem  Allergic rhinitis  J30.9     Active

 

 Problem  Pneumonia, organism unspecified  486     Active

 

 Problem  Need for prophylactic vaccination and inoculation, Influenza  V04.81 
    Active

 

 Problem  ZOSTAVAX DX  V05.8     Active

 

 Problem  Acute upper respiratory infections of unspecified site  465.9     
Active



                                                                               
                                                                               
          



Medications

          No Known Medications                                                 
                                       



Procedures

          





 Procedure  Coding System  Code  Date

 

 IMMUNOTHERAPY INJECTIONS  CPT-4  26496  2016



                                                                    



Results

          





 Name  Result  Date  Reference Range  Unit  Abnormality Flag

 

 IMMUNOTHERAPY, 2 OR MORE INJECTIONS               



                                                                    



Summary Purpose

          [employer] Submission

## 2018-01-14 NOTE — XMS REPORT
[facility]

 Created on: 2017



Carie Martinez

External Reference #: [id number]

: 1940

Sex: Female



Demographics







 Address  [address] [place]  [address]

 

 Preferred Language  Unknown

 

 Marital Status  Unknown

 

 Cheondoism Affiliation  Unknown

 

 Race  Unknown

 

 Ethnic Group  Unknown





Author







 Author  MARIAN BUENO

 

 [facility]

 

 Address  [address] [place]  [address]



 

 Phone  [phone]







Care Team Providers







 Care Team Member Name  Role  Phone

 

 MARIAN BUENO  Unavailable  [phone]







PROBLEMS







 Type  Condition  ICD9-CM Code  FXC77-WQ Code  Onset Dates  Condition Status  
SNOMED Code

 

 Problem  Allergic rhinitis     J30.9     Active  24038265

 

 Problem  Acquired hypothyroidism     E03.9     Active  017375347

 

 Problem  Hearing difficulty of left ear     H91.92     Active  543383566

 

 Problem  Allergic rhinitis, unspecified allergic rhinitis type     J30.9     
Active  74793471

 

 Problem  Urinary incontinence in female     R32     Active  350328937







ALLERGIES

No Information



SOCIAL HISTORY

Never Assessed



PLAN OF CARE





VITAL SIGNS





MEDICATIONS

Unknown Medications



RESULTS

No Results



PROCEDURES







 Procedure  Date Ordered  Result  Body Site

 

 IMMUNOTHERAPY INJECTIONS  May 30, 2017      







IMMUNIZATIONS

No Known Immunizations



MEDICAL (GENERAL) HISTORY







 Type  Description  Date

 

 Medical History  thyroid   

 

 Medical History  allergies   

 

 Medical History  hot flashes   

 

 Medical History  ruptured ovarian cyst   

 

 Surgical History  tonsillectomy   

 

 Surgical History  hysterectomy  

 

 Surgical History  ovarian cyst   

 

 Hospitalization History  surgeries   

 

 Hospitalization History  pneumonia and dehydration  2016

## 2018-01-14 NOTE — XMS REPORT
[facility]

 Created on: 2017



Carie Martinez

External Reference #: [id number]

: 1940

Sex: Female



Demographics







 Address  [address] [place]  [address]

 

 Preferred Language  Unknown

 

 Marital Status  Unknown

 

 Rastafari Affiliation  Unknown

 

 Race  Unknown

 

 Ethnic Group  Unknown





Author







 Author  MARIAN BUENO

 

 [facility]

 

 Address  [address] [place]  [address]



 

 Phone  [phone]







Care Team Providers







 Care Team Member Name  Role  Phone

 

 MARIAN BUENO  Unavailable  [phone]







PROBLEMS







 Type  Condition  ICD9-CM Code  FMK91-LL Code  Onset Dates  Condition Status  
SNOMED Code

 

 Problem  Allergic rhinitis     J30.9     Active  17952542

 

 Problem  Acquired hypothyroidism     E03.9     Active  898949185

 

 Problem  Hearing difficulty of left ear     H91.92     Active  339430727

 

 Problem  Allergic rhinitis, unspecified allergic rhinitis type     J30.9     
Active  94393911

 

 Problem  Urinary incontinence in female     R32     Active  833698668







ALLERGIES

No Information



SOCIAL HISTORY

Never Assessed



PLAN OF CARE





VITAL SIGNS





MEDICATIONS

Unknown Medications



RESULTS

No Results



PROCEDURES







 Procedure  Date Ordered  Result  Body Site

 

 IMMUNOTHERAPY INJECTIONS  2017      







IMMUNIZATIONS

No Known Immunizations



MEDICAL (GENERAL) HISTORY







 Type  Description  Date

 

 Medical History  thyroid   

 

 Medical History  allergies   

 

 Medical History  hot flashes   

 

 Medical History  ruptured ovarian cyst   

 

 Surgical History  tonsillectomy   

 

 Surgical History  hysterectomy  1987

 

 Surgical History  ovarian cyst   

 

 Hospitalization History  surgeries   

 

 Hospitalization History  pneumonia and dehydration  2016

## 2018-01-14 NOTE — XMS REPORT
[facility]

 Created on: 2017



Carie Martinez

External Reference #: [id number]

: 1940

Sex: Female



Demographics







 Address  [address] [place]  [address]

 

 Preferred Language  Unknown

 

 Marital Status  Unknown

 

 Restoration Affiliation  Unknown

 

 Race  Unknown

 

 Ethnic Group  Unknown





Author







 Author  GOLD MILLER

 

 [facility]

 

 Address  [address] [place]  [address]



 

 Phone  [phone]







Care Team Providers







 Care Team Member Name  Role  Phone

 

 GOLD MILLER  Unavailable  [phone]







PROBLEMS







 Type  Condition  ICD9-CM Code  EIZ28-GH Code  Onset Dates  Condition Status  
SNOMED Code

 

 Problem  Chondromalacia  733.92        Active  81525007

 

 Problem  Acute upper respiratory infections of unspecified site  465.9        
Active  29373780

 

 Problem  Pneumonia, organism unspecified  486        Active  445972615

 

 Problem  ZOSTAVAX DX  V05.8        Active  27694547

 

 Problem  Need for prophylactic vaccination and inoculation, Influenza  V04.81 
       Active  030814410

 

 Problem  Cough  786.2        Active  64451710

 

 Problem  Allergic rhinitis, unspecified allergic rhinitis type     J30.9     
Active  63143134

 

 Problem  Urinary incontinence in female     R32     Active  110034280

 

 Problem  Allergic rhinitis     J30.9     Active  97119221

 

 Problem  Allergic rhinitis, cause unspecified  477.9        Active  75356697

 

 Problem  Hearing difficulty of left ear     H91.92     Active  732030429

 

 Problem  Memory loss     R41.3     Active  19980733







ALLERGIES

Unknown Allergies



SOCIAL HISTORY

No smoking Hx information available



PLAN OF CARE





VITAL SIGNS





MEDICATIONS

Unknown Medications



RESULTS

No Results



PROCEDURES







 Procedure  Date Ordered  Related Diagnosis  Body Site

 

 IMMUNOTHERAPY, 2 OR MORE INJECTIONS  2016-10-18  N/A   

 

 IMMUNOTHERAPY INJECTIONS  Oct 18, 2016      







IMMUNIZATIONS

No Known Immunizations

## 2018-01-14 NOTE — ED SYNCOPE
General


Chief Complaint:  Dizziness/Syncope


Stated Complaint:  SYNCOPE/CONGESTION


Nursing Triage Note:  


Patient advises she went to [facility] this morning and as she was leaving to go to 


the grocery store with her son when she felt as though she was having a hot 


flash and had a syncopal episode short after. She denies falling and states her 


son caught her.


Source of Information:  Patient


Exam Limitations:  No Limitations





History of Present Illness


Time Seen by Provider:  11:44


Initial Comments


Sr. Darnell presents to the emergency room with complaints of a syncopal episode 

that happened earlier in the day.  Patient had eaten a banana for breakfast and 

then went to [facility].  She was kneeling at prior time when she suddenly felt a hot 

flash with lightheadedness and then proceeded to have a syncopal episode.  She 

was caught by those near her and did not fall or suffer any injury.  She had 

loss of consciousness for less than one minute.  She has felt well since then 

with the exception of slight headache and feeling tired.  She then went to 

breakfast after [facility].  She then decided to present to the emergency room for 

evaluation.  She reports having a upper respiratory infection or bronchitis 

about 3 weeks ago.  She was evaluated at [facility] and chest x-ray was performed.  

She believes that x-ray was normal.  However, she was prescribed antibiotics 

due to her history of multiple episodes of pneumonia.  Patient denies any 

history of other cardiopulmonary problems.





Allergies and Home Medications


Allergies


Coded Allergies:  


     No Known Drug Allergies (Unverified , 16)





Home Medications


Ascorbate Calcium 500 Mg Tablet, 500 MG PO DAILY, (Reported)


Aspirin/Acetaminophen/Caffeine 1 Each Tablet, 1 TAB PO BID PRN for PAIN, (

Reported)


Azithromycin 500 Mg Tablet, 500 MG PO DAILY, #5


   Prescribed by: RONDA GUTIERREZ on 16 1002


Calcium Carbonate/Vitamin D3 1 Each Tablet, 1 TAB PO DAILY, (Reported)


Cefdinir 300 Mg Capsule, 300 MG PO BID, #10


   Prescribed by: RONDA GUTIERREZ on 16 1002


Cetirizine HCl 10 Mg Capsule, 10 MG PO DAILY PRN for ALLERGIES, (Reported)


Cholecalciferol (Vitamin D3) 1,000 Unit Capsule, 1,000 UNIT PO DAILY, (Reported)


Doxycycline Hyclate 100 Mg Tablet, 100 MG PO BID, #20


   Prescribed by: MATTEO KEE on 18 1457


Fluticasone Propionate 16 Gm Spray.susp, 2 SPRAYS NS DAILY, (Reported)


Levothyroxine Sodium 50 Mcg Tablet, 50 MCG PO DAILY, (Reported)


Mu-Vits-Min Th/Lycopene/Lutein 1 Each Tablet, 1 TAB PO DAILY, (Reported)


Omega-3 Fatty Acids/Fish Oil 1 Each Capsule, 1,000 MG PO DAILY, (Reported)


Omeprazole 20 Mg Tablet.dr, 20 MG PO DAILY, #30


   Prescribed by: RONDA GUTIERREZ on 16 1002


Ondansetron 8 Mg Tab.rapdis, 8 MG PO Q6H PRN for NAUSEA, #20


   Prescribed by: RONDA GUTIERREZ on 16 1002


Propylene Glycol 10 Ml Drops, 1 DROP OU Q4H PRN for DRY EYES, (Reported)


Vitamin B Complex 1 Each Capsule, 1 CAP PO DAILY, (Reported)





Constitutional:  see HPI


EENTM:  no symptoms reported


Respiratory:  no symptoms reported


Cardiovascular:  see HPI


Gastrointestinal:  no symptoms reported


Genitourinary:  no symptoms reported


Pregnant:  No


Musculoskeletal:  no symptoms reported


Skin:  no symptoms reported


Psychiatric/Neurological:  No Symptoms Reported





Past Medical-Social-Family Hx


Patient Social History


Alcohol Use:  Denies Use


Recreational Drug Use:  No


Smoking Status:  Never a Smoker


Recent Foreign Travel:  No


Contact w/Someone Who Travel:  No


Recent Infectious Disease Expo:  No


Recent Hopitalizations:  No


Physical Abuse:  No


Sexual Abuse:  No





Immunizations Up To Date


Tetanus Booster (TDap):  Unknown


Date of Pneumonia Vaccine:  2013


Date of Influenza Vaccine:  Oct 17, 2016





Seasonal Allergies


Seasonal Allergies:  Yes (Receives allergy injections)





Surgeries


History of Surgeries:  Yes (TONSILLECTOMY 1948   HYSTERECTOMY )


Surgeries:  Appendectomy





Respiratory


History of Respiratory Disorde:  Yes (HX OF SEVERAL PNEUMONIA'S   )


Respiratory Disorders:  Pneumonia


Currently Using CPAP:  No


Currently Using BIPAP:  No





Cardiovascular


History of Cardiac Disorders:  No





Neurological


History of Neurological Disord:  No





Reproductive System


Pregnant:  No


Hx Reproductive Disorders:  No





Genitourinary


History of Genitourinary Disor:  No





Gastrointestinal


History of Gastrointestinal Di:  No





Musculoskeletal


History of Musculoskeletal Dis:  No





Endocrine


History of Endocrine Disorders:  Yes


Endocrine Disorders:  Hypothyroidsim





Cancer


History of Cancer:  No





Psychosocial


History of Psychiatric Problem:  No


Suicide Risk Score:  0





Integumentary


History of Skin or Integumenta:  No





Blood Transfusions


History of Blood Disorders:  No





Family Medical History


Family Medial History:  


Neoplasm


  17 MOTHER (BRAIN TUMOR)


Parkinson's disease


  19 FATHER


  G8 SISTER





Physical Exam


Vital Signs





Vital Sign - Last 12Hours








 18





 11:13


 


Temp 98.4


 


Pulse 69


 


Resp 14


 


B/P (MAP) 156/80 (105)


 


Pulse Ox 98


 


O2 Delivery Room Air





Capillary Refill : Less Than 3 Seconds


General Appearance:  No Apparent Distress, Thin


HEENT:  PERRL/EOMI, Normal ENT Inspection, Pharynx Normal


Neck:  Normal Inspection, Supple, No Carotid Bruit, No JVD


Cardiovascular:  Regular Rate, Rhythm, No Edema, No Murmur, Normal Peripheral 

Pulses


Respiratory:  Lungs Clear, Normal Breath Sounds, No Accessory Muscle Use, No 

Respiratory Distress


Gastrointestinal:  Normal Bowel Sounds, Non Tender, Soft


Extremities:  Normal Capillary Refill, Normal Inspection, Non Tender, No Pedal 

Edema


Neurologic/Psychiatric:  Alert, Oriented x3, No Motor/Sensory Deficits, Normal 

Mood/Affect, CNs II-XII Norm as Tested


Cranial Nerves:  Normal Hearing, Normal Speech, PERRL


Motor/Sensory:  No Motor Deficit, No Sensory Deficit


Skin:  Normal Color, Warm/Dry





Progress/Results/Core Measures


Results/Orders


Lab Results





Laboratory Tests








Test


  18


11:20 18


12:44 Range/Units


 


 


Urine Color YELLOW    


 


Urine Clarity CLEAR    


 


Urine pH 7   5-9  


 


Urine Specific Gravity 1.005 L  1.016-1.022  


 


Urine Protein NEGATIVE   NEGATIVE  


 


Urine Glucose (UA) NEGATIVE   NEGATIVE  


 


Urine Ketones NEGATIVE   NEGATIVE  


 


Urine Nitrite NEGATIVE   NEGATIVE  


 


Urine Bilirubin NEGATIVE   NEGATIVE  


 


Urine Urobilinogen NORMAL   NORMAL  MG/DL


 


Urine Leukocyte Esterase NEGATIVE   NEGATIVE  


 


Urine RBC (Auto) NEGATIVE   NEGATIVE  


 


Urine RBC NONE    /HPF


 


Urine WBC NONE    /HPF


 


Urine Crystals NONE    /LPF


 


Urine Bacteria TRACE    /HPF


 


Urine Casts NONE    /LPF


 


Urine Mucus NEGATIVE    /LPF


 


Urine Culture Indicated NO    


 


White Blood Count


  


  5.2 


  4.3-11.0


10^3/uL


 


Red Blood Count


  


  4.35 


  4.35-5.85


10^6/uL


 


Hemoglobin  14.1  11.5-16.0  G/DL


 


Hematocrit  41  35-52  %


 


Mean Corpuscular Volume  95  80-99  FL


 


Mean Corpuscular Hemoglobin  32  25-34  PG


 


Mean Corpuscular Hemoglobin


Concent 


  34 


  32-36  G/DL


 


 


Red Cell Distribution Width  12.9  10.0-14.5  %


 


Platelet Count


  


  265 


  130-400


10^3/uL


 


Mean Platelet Volume  9.9  7.4-10.4  FL


 


Neutrophils (%) (Auto)  51  42-75  %


 


Lymphocytes (%) (Auto)  35  12-44  %


 


Monocytes (%) (Auto)  8  0-12  %


 


Eosinophils (%) (Auto)  4  0-10  %


 


Basophils (%) (Auto)  1  0-10  %


 


Neutrophils # (Auto)  2.6  1.8-7.8  X 10^3


 


Lymphocytes # (Auto)  1.8  1.0-4.0  X 10^3


 


Monocytes # (Auto)  0.4  0.0-1.0  X 10^3


 


Eosinophils # (Auto)


  


  0.2 


  0.0-0.3


10^3/uL


 


Basophils # (Auto)


  


  0.1 


  0.0-0.1


10^3/uL


 


Prothrombin Time  13.9  12.2-14.7  SEC


 


INR Comment  1.1  0.8-1.4  


 


Activated Partial


Thromboplast Time 


  28 


  24-35  SEC


 


 


Sodium Level  140  135-145  MMOL/L


 


Potassium Level  4.4  3.6-5.0  MMOL/L


 


Chloride Level  106    MMOL/L


 


Carbon Dioxide Level  25  21-32  MMOL/L


 


Anion Gap  9  5-14  MMOL/L


 


Blood Urea Nitrogen  12  7-18  MG/DL


 


Creatinine


  


  0.79 


  0.60-1.30


MG/DL


 


Estimat Glomerular Filtration


Rate 


  > 60 


   


 


 


BUN/Creatinine Ratio  15   


 


Glucose Level  98    MG/DL


 


Calcium Level  9.4  8.5-10.1  MG/DL


 


Magnesium Level  2.3  1.8-2.4  MG/DL


 


Total Bilirubin  0.3  0.1-1.0  MG/DL


 


Aspartate Amino Transf


(AST/SGOT) 


  22 


  5-34  U/L


 


 


Alanine Aminotransferase


(ALT/SGPT) 


  17 


  0-55  U/L


 


 


Alkaline Phosphatase  74    U/L


 


Myoglobin


  


  50.7 


  10.0-92.0


NG/ML


 


Troponin I  < 0.30  <0.30  NG/ML


 


Total Protein  6.4  6.4-8.2  GM/DL


 


Albumin  3.9  3.2-4.5  GM/DL


 


Thyroid Stimulating Hormone


(TSH) 


  1.22 


  0.35-4.94


UIU/ML


 


Free Thyroxine


  


  1.09 


  0.70-1.48


NG/DL








My Orders





Orders - MATTEO WHITE MD


Cbc With Automated Diff (18 11:45)


Magnesium (18 11:45)


Chest 1 View, Ap/Pa Only (18 11:45)


Ekg Tracing (18 11:45)


Cardiac Profile 1 (18 11:45)


Comprehensive Metabolic Panel (18 11:45)


Myoglobin Serum (18 11:45)


Protime With Inr (18 11:45)


Partial Thromboplastin Time (18 11:45)


O2 (18 11:45)


Monitor-Rhythm Ecg Trace Only (18 11:45)


Saline Lock/Iv-Start (18 11:45)


Ua Culture If Indicated (18 11:45)


Thyroid Stimulating Hormone (18 12:29)


Free T4 (Free Thyroxine) (18 12:29)





Vital Signs/I&O





Vital Sign - Last 12Hours








 18





 11:13 11:30 15:21


 


Temp 98.4  


 


Pulse 69 69 81





  82 





  80 


 


Resp 14  14


 


B/P (MAP) 156/80 (105) 156/80 (105) 





  156/94 (114) 





  128/80 (96) 


 


Pulse Ox 98  98


 


O2 Delivery Room Air  Room Air














Blood Pressure Mean:  96








Progress Note :  


Progress Note


Patient had an unremarkable ER course.  She had perihilar infiltrates noted on 

chest x-ray.  Patient was placed on doxycycline for treatment of possible early 

or lingering pneumonia.  She was instructed to follow up closely.  See 

discharge instructions for our discharge conversation.





ECG


Initial ECG Impression Date:  2018


Initial ECG Impression Time:  12:33


Initial ECG Rate:  58


Initial ECG Rhythm:  Normal Sinus


Initial ECG Intervals:  Normal


Initial ECG Impression:  Normal


Comment


Normal sinus rhythm with no ST elevation or depression.  No abnormal intervals 

or axis deviation.





Diagnostic Imaging





   Diagonstic Imaging:  Xray


   Plain Films/CT/US/NM/MRI:  chest


Comments


Chest x-ray viewed by me and report reviewed.  See report below:





NAME:   BRAN WANG 


MED REC#:   [id number]


ACCOUNT#:   [id number]


PT STATUS:   REG ER


:   1940


PHYSICIAN:   MATTEO WHITE MD


ADMIT DATE:   18/ER


   ***Draft***


Date of Exam:18





CHEST 1 VIEW, AP/PA ONLY





INDICATION: Cough and congestion.





COMPARISON: 16





FINDINGS: Single view of the chest demonstrates hilar infiltrates


with basilar atelectasis. There is no pneumothorax. The heart is


prominent without pulmonary edema.





IMPRESSION: Hilar infiltrate with bibasilar atelectasis. Followup


recommended.





  Dictated on workstation # [id number]





Dict:   18 1230


Trans:   18 1231


CATIE [id number]





Interpreted by:     OMAIRA AMOR


Electronically signed by:





Departure


Impression


Impression:  


 Primary Impression:  


 Syncope


 Qualified Codes:  R55 - Syncope and collapse


 Additional Impression:  


 Lung infiltrate


Disposition:   HOME, SELF-CARE


Condition:  Improved





Departure-Patient Inst.


Decision time for Depature:  14:55


Referrals:  


FESTUS MAN DO (PCP)


Primary Care Physician








MARIAN BUENO (Family)


Primary Care Physician


Patient Instructions:  Syncope (Fainting) (DC)





Add. Discharge Instructions:  


Complete your antibiotics as prescribed.  Please follow-up with your primary 

care provider within the next week.  I recommend a repeat chest x-ray in about 

2 weeks to ensure improvement in the x-ray findings.  Return to emergency room 

if symptoms worsen.  Stay well-hydrated and eat a well-balanced diet.  

Gradually increase daily activities as tolerated.  Avoid activities that might 

be dangerous should you have another episode of fainting until you follow up 

with your doctor.














All discharge instructions reviewed with patient and/or family. Voiced 

understanding.


Scripts


Doxycycline Hyclate (Doxycycline Hyclate) 100 Mg Tablet


100 MG PO BID, #20 TAB


   Prov: MATTEO WHTIE MD         18





Copy


Copies To 1:   FESTUS MAN JOSHUA T MD 2018 12:40

## 2018-02-02 ENCOUNTER — HOSPITAL ENCOUNTER (OUTPATIENT)
Dept: HOSPITAL 75 - RAD | Age: 78
End: 2018-02-02
Attending: NURSE PRACTITIONER
Payer: MEDICARE

## 2018-02-02 DIAGNOSIS — R92.1: ICD-10-CM

## 2018-02-02 DIAGNOSIS — Z12.31: Primary | ICD-10-CM

## 2018-02-02 PROCEDURE — 77067 SCR MAMMO BI INCL CAD: CPT

## 2018-02-02 NOTE — DIAGNOSTIC IMAGING REPORT
Indication: Routine screening.



Comparison is made with prior study from 10/10/2016 at

06/27/2014.



The current study was also evaluated with a Computer Aided

Detection (CAD) system.



Both breasts are markedly dense, limiting the sensitivity of

mammography. There is a cluster of microcalcifications in the

upper outer aspect of the left breast at mid depth, appearing

increased since the mammogram from 2016. Additional views are

recommended. There are retroareolar benign calcifications  on the

left, stable. No mass is detected. Axilla are unremarkable.



Impression: BI-RADS zero



Left breast calcifications. Additional views are recommended.



ACR BI-RADS Category 0: Incomplete. (Needs additional imaging

evaluation).

Result letter will be mailed to the patient.

Note: At least 10% of breast cancer is not imaged by mammography.























Dictated by: 



  Dictated on workstation # [id number]

## 2018-02-23 ENCOUNTER — HOSPITAL ENCOUNTER (OUTPATIENT)
Dept: HOSPITAL 75 - RAD | Age: 78
End: 2018-02-23
Attending: NURSE PRACTITIONER
Payer: MEDICARE

## 2018-02-23 DIAGNOSIS — R92.0: Primary | ICD-10-CM

## 2018-02-23 NOTE — DIAGNOSTIC IMAGING REPORT
INDICATION: Left breast calcifications. Patient presents for

additional views.



COMPARISON: Comparison is made with recent screening study from

02/02/2018 as well as mammogram from 10/10/2016.



The patient returned, and magnification CC and mediolateral view

as well as a conventional 90 degree 3D lateral view was

performed.



The current study was also evaluated with a Computer Aided

Detection (CAD) system.



FINDINGS: There is a cluster of microcalcifications in the upper

and outer aspect of the left breast mid depth. No significant

pleomorphism is seen. There is some linearity of the

calcifications. No associated soft tissue mass is detected. There

are other benign calcifications more anteriorly in the left

breast.



IMPRESSION: Emerging cluster of microcalcifications in the

upper-outer left breast mid depth when compared with prior

mammogram. Tissue sampling is recommended. This would be amenable

to stereotactic approach.



Findings and recommendations were discussed with the patient at

the time of the exam.



ACR BI-RADS Category 4: Suspicious abnormality.

Result letter will be mailed to the patient.

Note: At least 10% of breast cancer is not imaged by mammography.



Dictated by: 



  Dictated on workstation # [id number]

## 2018-03-23 VITALS — SYSTOLIC BLOOD PRESSURE: 118 MMHG | DIASTOLIC BLOOD PRESSURE: 62 MMHG

## 2018-03-29 ENCOUNTER — HOSPITAL ENCOUNTER (OUTPATIENT)
Dept: HOSPITAL 75 - RAD | Age: 78
End: 2018-03-29
Attending: NURSE PRACTITIONER
Payer: MEDICARE

## 2018-03-29 VITALS — DIASTOLIC BLOOD PRESSURE: 69 MMHG | SYSTOLIC BLOOD PRESSURE: 112 MMHG

## 2018-03-29 VITALS — BODY MASS INDEX: 23.21 KG/M2 | WEIGHT: 131 LBS | HEIGHT: 63 IN

## 2018-03-29 DIAGNOSIS — C50.812: Primary | ICD-10-CM

## 2018-03-29 PROCEDURE — 19081 BX BREAST 1ST LESION STRTCTC: CPT

## 2018-03-29 NOTE — DIAGNOSTIC IMAGING REPORT
INDICATION: 

Left breast calcifications. Patient presents for stereotactic

biopsy.



TECHNIQUE: 

The patient was brought to the stereotactic suite. The breast was

positioned in a lateral medial position. The calcifications of

the left breast were stereotactically targeted. The left breast

was then prepped and draped in the usual sterile fashion. A small

amount of 1% lidocaine was utilized for local anesthesia. A total

of four core biopsies was made of the calcifications in the outer

left breast utilizing a vacuum-assisted device and an 8 gauge

needle. Followup imaging of the samples does show multiple

calcifications within samples

#2 and #3. A clip was deployed. Hemostasis was obtained using

manual compression. Followup mammographic imaging does show a

clip in the outer left breast at mid depth.



IMPRESSION: 

Successful vacuum assisted stereotactic biopsy of left breast

calcifications. Pathology results are currently pending.



Dictated by: 



  Dictated on workstation # [id number]

## 2019-02-08 ENCOUNTER — HOSPITAL ENCOUNTER (OUTPATIENT)
Dept: HOSPITAL 75 - RAD | Age: 79
End: 2019-02-08
Attending: NURSE PRACTITIONER
Payer: MEDICARE

## 2019-02-08 DIAGNOSIS — Z90.12: ICD-10-CM

## 2019-02-08 DIAGNOSIS — D05.12: Primary | ICD-10-CM

## 2019-02-08 PROCEDURE — 77066 DX MAMMO INCL CAD BI: CPT

## 2019-02-08 NOTE — DIAGNOSTIC IMAGING REPORT
INDICATION: Left breast carcinoma, status post lumpectomy. Study

is performed for followup.



Correlation is made with prior mammogram from 03/29/2018,

02/23/2018 as well as 02/02/2018.



2-D and 3-D bilateral diagnostic mammography was performed with a

Computer Aided Detection (CAD) system.



FINDINGS: Both breasts are heterogeneously dense, limiting the

sensitivity of mammography. Previously noted microcalcifications

in the left breast are no longer visualized and have been

surgically removed. No new mass or malignant appearing

microcalcifications are seen. There are occasional benign

calcifications present. Axillae are unremarkable.



IMPRESSION: Postsurgical changes on the left. No mammographic

features suspicious for malignancy are identified.



ACR BI-RADS Category 2: Benign findings.

Result letter will be mailed to the patient.

Note: At least 10% of breast cancer is not imaged by mammography.



Dictated by: 



  Dictated on workstation # [id number]

## 2019-04-19 ENCOUNTER — HOSPITAL ENCOUNTER (OUTPATIENT)
Dept: HOSPITAL 75 - RAD | Age: 79
End: 2019-04-19
Attending: NURSE PRACTITIONER
Payer: MEDICARE

## 2019-04-19 DIAGNOSIS — G47.50: ICD-10-CM

## 2019-04-19 DIAGNOSIS — G47.33: ICD-10-CM

## 2019-04-19 DIAGNOSIS — G47.10: ICD-10-CM

## 2019-04-19 DIAGNOSIS — J47.9: ICD-10-CM

## 2019-04-19 DIAGNOSIS — G47.34: Primary | ICD-10-CM

## 2019-04-19 LAB
BUN/CREAT SERPL: 11
CREAT SERPL-MCNC: 0.79 MG/DL (ref 0.6–1.3)
GFR SERPLBLD BASED ON 1.73 SQ M-ARVRAT: > 60 ML/MIN

## 2019-04-19 PROCEDURE — 71260 CT THORAX DX C+: CPT

## 2019-04-19 PROCEDURE — 84520 ASSAY OF UREA NITROGEN: CPT

## 2019-04-19 PROCEDURE — 36415 COLL VENOUS BLD VENIPUNCTURE: CPT

## 2019-04-19 PROCEDURE — 82565 ASSAY OF CREATININE: CPT

## 2019-04-19 NOTE — DIAGNOSTIC IMAGING REPORT
PROCEDURE: CT chest with contrast only.



TECHNIQUE: Multiple contiguous axial images were obtained through

the chest after administration of intravenous contrast. Auto

Exposure Controls were utilized during the CT exam to meet ALARA

standards for radiation dose reduction. 



INDICATION:  Nocturnal hypoxemia and congestion.



Correlation is made with prior CT chest from 03/02/2017.



FINDINGS: No axillary lymphadenopathy is seen. No definite hilar

or mediastinal lymphadenopathy is detected. No pericardial or

pleural fluid is identified. Previously noted bronchiectatic and

fibrotic changes bilaterally are again noted. These appear to

involve the inferior aspects of the upper lobes. There are also

some mild fibrotic changes in right middle lobe and lingula. Tiny

nodular densities are noted on today's study particularly in the

right lower lobe. Subpleural nodule posteriorly is seen measuring

4 mm, image 20. 5 mm nodule right lower lobe is noted, image 21.

Several tiny nodular densities posterior laterally in the right

lower lobe image 27 are seen. There is a nodule on image 29 in

the posterior lateral right lower lobe measuring 2 mm. Left lung

appears to be clear. Bony structures are nonacute.



IMPRESSION: Stable chronic changes when compared with examination

from 2 years earlier. There is some minimal nodularity that has

developed in the right lower lobe which may be on an

infectious/inflammatory basis. Continued close followup and

repeat study in 6 months is recommended to confirm stability.



Dictated by: 



  Dictated on workstation # [id number]

## 2019-11-11 ENCOUNTER — HOSPITAL ENCOUNTER (OUTPATIENT)
Dept: HOSPITAL 75 - RAD | Age: 79
End: 2019-11-11
Attending: INTERNAL MEDICINE
Payer: MEDICARE

## 2019-11-11 DIAGNOSIS — R91.8: Primary | ICD-10-CM

## 2019-11-11 DIAGNOSIS — R05: ICD-10-CM

## 2019-11-11 PROCEDURE — 71046 X-RAY EXAM CHEST 2 VIEWS: CPT

## 2019-11-11 NOTE — DIAGNOSTIC IMAGING REPORT
INDICATION: Cough.



TIME OF EXAM: 03:42 p.m.



Correlation is made with prior chest from 05/10/2018.



FINDINGS: Heart size is stable. There are some chronic opacities

in both lungs. On the right, this is in the perihilar region. On

the left, this is along the left heart border. This is similar to

prior exam. No new infiltrate is seen. No effusion or

pneumothorax is detected.



IMPRESSION: Stable chronic parenchymal opacities when compared

with study from May 2018. No new abnormality is detected.



Dictated by: 



  Dictated on workstation # [id number]

## 2020-02-18 ENCOUNTER — HOSPITAL ENCOUNTER (OUTPATIENT)
Dept: HOSPITAL 75 - RAD | Age: 80
End: 2020-02-18
Attending: NURSE PRACTITIONER
Payer: MEDICARE

## 2020-02-18 DIAGNOSIS — Z12.31: Primary | ICD-10-CM

## 2020-02-18 PROCEDURE — 77067 SCR MAMMO BI INCL CAD: CPT

## 2020-02-18 NOTE — DIAGNOSTIC IMAGING REPORT
INDICATION: Screening.



The current study was also evaluated with a Computer Aided

Detection (CAD) system. 3-D Tomographic imaging was also

performed.



Comparison made with prior examination from 02/08/2019,

03/29/2018, 02/02/2018, and 10/10/2016.



FINDINGS: The fibroglandular tissue is heterogeneously dense

bilaterally. There are benign-type calcifications in both

breasts. There is no dominant mass, spiculated lesion, or

suspicious calcification identified. Skin, nipples, and axillae

are unremarkable.



IMPRESSION: Benign.



ACR BI-RADS Category 2: Benign findings.

Result letter will be mailed to the patient.

Note: At least 10% of breast cancer is not imaged by mammography.



Dictated by: 



  Dictated on workstation # [id number]

## 2021-02-23 ENCOUNTER — HOSPITAL ENCOUNTER (OUTPATIENT)
Dept: HOSPITAL 75 - RAD | Age: 81
End: 2021-02-23
Attending: NURSE PRACTITIONER
Payer: MEDICARE

## 2021-02-23 DIAGNOSIS — Z12.31: Primary | ICD-10-CM

## 2021-02-23 PROCEDURE — 77067 SCR MAMMO BI INCL CAD: CPT

## 2021-02-23 PROCEDURE — 77063 BREAST TOMOSYNTHESIS BI: CPT

## 2021-02-24 NOTE — DIAGNOSTIC IMAGING REPORT
Indication: Routine screening.



Comparison is made with prior mammogram 2/18/2020 and 2/8/2019.



2-D and 3-D bilateral screening mammography was performed with

CAD.



Both breasts remain heterogeneously dense, limiting the

sensitivity of mammography. There are benign calcifications. No

mass or malignant appearing microcalcifications are seen. Axillae

are unremarkable.



IMPRESSION: BI-RADS Category 2



No mammographic features suspicious for malignancy are

identified.



ACR BI-RADS Category 2: Benign findings.

Result letter will be mailed to the patient.

Note: At least 10% of breast cancer is not imaged by mammography.



Dictated by: 



  Dictated on workstation # [id number]

## 2021-02-27 ENCOUNTER — HOSPITAL ENCOUNTER (EMERGENCY)
Dept: HOSPITAL 75 - ER | Age: 81
Discharge: HOME | End: 2021-02-27
Payer: MEDICARE

## 2021-02-27 VITALS — DIASTOLIC BLOOD PRESSURE: 73 MMHG | SYSTOLIC BLOOD PRESSURE: 117 MMHG

## 2021-02-27 VITALS — HEIGHT: 62.99 IN | BODY MASS INDEX: 21.8 KG/M2 | WEIGHT: 123.02 LBS

## 2021-02-27 DIAGNOSIS — E03.9: ICD-10-CM

## 2021-02-27 DIAGNOSIS — Z79.82: ICD-10-CM

## 2021-02-27 DIAGNOSIS — M13.862: Primary | ICD-10-CM

## 2021-02-27 DIAGNOSIS — Z79.890: ICD-10-CM

## 2021-02-27 DIAGNOSIS — Z80.8: ICD-10-CM

## 2021-02-27 LAB
ALBUMIN SERPL-MCNC: 4.5 GM/DL (ref 3.2–4.5)
ALP SERPL-CCNC: 84 U/L (ref 40–136)
ALT SERPL-CCNC: 23 U/L (ref 0–55)
BASOPHILS # BLD AUTO: 0 10^3/UL (ref 0–0.1)
BASOPHILS NFR BLD AUTO: 0 % (ref 0–10)
BILIRUB SERPL-MCNC: 0.7 MG/DL (ref 0.1–1)
BUN/CREAT SERPL: 10
CALCIUM SERPL-MCNC: 9.3 MG/DL (ref 8.5–10.1)
CHLORIDE SERPL-SCNC: 95 MMOL/L (ref 98–107)
CO2 SERPL-SCNC: 24 MMOL/L (ref 21–32)
CREAT SERPL-MCNC: 0.8 MG/DL (ref 0.6–1.3)
EOSINOPHIL # BLD AUTO: 0 10^3/UL (ref 0–0.3)
EOSINOPHIL NFR BLD AUTO: 0 % (ref 0–10)
GFR SERPLBLD BASED ON 1.73 SQ M-ARVRAT: > 60 ML/MIN
GLUCOSE SERPL-MCNC: 122 MG/DL (ref 70–105)
HCT VFR BLD CALC: 43 % (ref 35–52)
HGB BLD-MCNC: 14.5 G/DL (ref 11.5–16)
LYMPHOCYTES # BLD AUTO: 1.3 10^3/UL (ref 1–4)
LYMPHOCYTES NFR BLD AUTO: 12 % (ref 12–44)
MANUAL DIFFERENTIAL PERFORMED BLD QL: NO
MCH RBC QN AUTO: 32 PG (ref 25–34)
MCHC RBC AUTO-ENTMCNC: 34 G/DL (ref 32–36)
MCV RBC AUTO: 95 FL (ref 80–99)
MONOCYTES # BLD AUTO: 0.6 10^3/UL (ref 0–1)
MONOCYTES NFR BLD AUTO: 6 % (ref 0–12)
NEUTROPHILS # BLD AUTO: 8.4 10^3/UL (ref 1.8–7.8)
NEUTROPHILS NFR BLD AUTO: 81 % (ref 42–75)
PLATELET # BLD: 230 10^3/UL (ref 130–400)
PMV BLD AUTO: 11 FL (ref 9–12.2)
POTASSIUM SERPL-SCNC: 4.2 MMOL/L (ref 3.6–5)
PROT SERPL-MCNC: 7.7 GM/DL (ref 6.4–8.2)
SODIUM SERPL-SCNC: 129 MMOL/L (ref 135–145)
WBC # BLD AUTO: 10.3 10^3/UL (ref 4.3–11)

## 2021-02-27 PROCEDURE — 87040 BLOOD CULTURE FOR BACTERIA: CPT

## 2021-02-27 PROCEDURE — 86141 C-REACTIVE PROTEIN HS: CPT

## 2021-02-27 PROCEDURE — 80053 COMPREHEN METABOLIC PANEL: CPT

## 2021-02-27 PROCEDURE — 36415 COLL VENOUS BLD VENIPUNCTURE: CPT

## 2021-02-27 PROCEDURE — 85025 COMPLETE CBC W/AUTO DIFF WBC: CPT

## 2021-02-27 PROCEDURE — 73562 X-RAY EXAM OF KNEE 3: CPT

## 2021-02-27 NOTE — DIAGNOSTIC IMAGING REPORT
Indication: Left knee pain and swelling.



Comparison: None.



Discussion: Three views of the left knee were obtained.

Moderately advanced degenerative disease is noted affecting all 3

compartments. Small effusion. No fracture or dislocation. Soft

tissues are unremarkable. Alignment is anatomic.



Impression:

1. Moderately advanced left knee degenerative disease. No acute

abnormality.



Dictated by: 



  Dictated on workstation # [id number]

## 2021-06-01 ENCOUNTER — HOSPITAL ENCOUNTER (OUTPATIENT)
Dept: HOSPITAL 75 - CARD | Age: 81
End: 2021-06-01
Attending: INTERNAL MEDICINE
Payer: MEDICARE

## 2021-06-01 DIAGNOSIS — I08.3: Primary | ICD-10-CM

## 2021-06-01 DIAGNOSIS — I87.8: ICD-10-CM

## 2021-06-01 PROCEDURE — 93306 TTE W/DOPPLER COMPLETE: CPT

## 2021-10-19 ENCOUNTER — HOSPITAL ENCOUNTER (OUTPATIENT)
Dept: HOSPITAL 75 - RAD | Age: 81
End: 2021-10-19
Attending: INTERNAL MEDICINE
Payer: MEDICARE

## 2021-10-19 DIAGNOSIS — J98.4: ICD-10-CM

## 2021-10-19 DIAGNOSIS — J47.9: Primary | ICD-10-CM

## 2021-10-19 DIAGNOSIS — R91.8: ICD-10-CM

## 2021-10-19 PROCEDURE — 71250 CT THORAX DX C-: CPT

## 2021-10-19 NOTE — DIAGNOSTIC IMAGING REPORT
EXAMINATION: CT chest without contrast (high resolution)



TECHNIQUE: Prone and supine non-contrast high resolution CT

images of the chest were obtained in inspiration and expiration.

All CT scans use one or more of the following dose optimizing

techniques: automated exposure control, MA and/or KvP adjustment

based on patient size and exam type or iterative reconstruction. 



HISTORY: BRONCHIECTASIS



COMPARISON: 04/19/2019



FINDINGS: 



Thyroid: The thyroid is normal.



Mediastinum: Heart size is normal without significant pericardial

effusion. Calcifications of the aorta and coronary vessels.

Thoracic aorta is normal in caliber. No suspicious

lymphadenopathy.



Lungs and airways: There are scattered reticulonodular and

tree-in-bud opacities seen throughout both lungs. These do not

resolve on prone images. There is mild air trapping seen within

the lungs on expiratory images. There is redemonstrated scarring

and traction bronchiectasis seen within the right middle lobe and

lingula. No significant subpleural reticulation or honeycombing.

No pleural effusion or pneumothorax. 



Upper abdomen: The subphrenic structures are normal. 



Musculoskeletal: Degenerative changes of the spine without

suspicious osseous lesion or compression fracture.



IMPRESSION:



1. Redemonstrated reticulonodular opacities seen throughout both

lungs with scarring and bronchiectasis seen within the right

middle lobe and lingula. These findings suggest atypical

infection such as mycobacterium avium complex and [place]

syndrome.



Dictated by: 



  Dictated on workstation # [id number]

## 2022-05-27 NOTE — ED LOWER EXTREMITY
General


Chief Complaint:  Lower Extremity


Stated Complaint:  L KNEE PAIN


Nursing Triage Note:  


PT AMB TO [facility] WITH COMPLAINT OF LEFT KNEE PAIN AND SWELLING. WAS SENT HERE BY 


[facility] FOR FURTHER EVALUATION. STATES HAD A JOINT INJECTION ON THURSDAY BY DEMARIO PRICE.


Nursing Sepsis Screen:  No Definite Risk


Source:  patient


Exam Limitations:  no limitations





History of Present Illness


Date Seen by Provider:  2021


Time Seen by Provider:  11:03


Initial Comments


Patient presents ER by private conveyance from home with chief complaint that 

she has had swelling and severe pain and difficulty walking on her left knee 

after having a injection by Demario Price 2 days ago on Thursday.  She had steroids

injected would like she is had several times in the past and usually it works 

very well for her but this time her pain has increased.  She has history of 

arthritis but no scopes or surgeries on her knees.  She is no having any 

weakness numbness tingling history of peripheral arterial disease or trauma that

is new to her knee.  She checks her temperature daily and has had no fevers or 

chills.  She went to urgent care and they told her they were concerned she might

need her knee tapped for possible infection.





Allergies and Home Medications


Allergies


Coded Allergies:  


     No Known Drug Allergies (Unverified , 16)





Home Medications


Ascorbate Calcium 500 Mg Tablet, 500 MG PO DAILY, (Reported)


Aspirin/Acetaminophen/Caffeine 1 Each Tablet, 1 TAB PO BID PRN for PAIN, 

(Reported)


Azithromycin 500 Mg Tablet, 500 MG PO DAILY


   Prescribed by: RONDA GUTIERREZ on 16 1002


Calcium Carbonate/Vitamin D3 1 Each Tablet, 1 TAB PO DAILY, (Reported)


Cefdinir 300 Mg Capsule, 300 MG PO BID


   Prescribed by: RONDA GUTIERREZ on 16 1002


Cetirizine HCl 10 Mg Capsule, 10 MG PO DAILY PRN for ALLERGIES, (Reported)


Cholecalciferol (Vitamin D3) 1,000 Unit Capsule, 1,000 UNIT PO DAILY, (Reported)


Doxycycline Hyclate 100 Mg Tablet, 100 MG PO BID


   Prescribed by: MATTEO KEE on 18 1457


Fluticasone Propionate 16 Gm Spray.susp, 2 SPRAYS NS DAILY, (Reported)


Hydrocodone/Acetaminophen 1 Each Tablet, 1 TAB PO Q6H PRN for PAIN-MODERATE (5-

7)


   Prescribed by: TEMI WILKINSON on 21 1252


Levothyroxine Sodium 50 Mcg Tablet, 50 MCG PO DAILY, (Reported)


Meloxicam 15 Mg Tablet, 15 MG PO DAILY


   Prescribed by: TEMI WILKINSON on 21 1249


Mu-Vits-Min Th/Lycopene/Lutein 1 Each Tablet, 1 TAB PO DAILY, (Reported)


Omega-3 Fatty Acids/Fish Oil 1 Each Capsule, 1,000 MG PO DAILY, (Reported)


Omeprazole 20 Mg Tablet.dr, 20 MG PO DAILY


   Prescribed by: RONDA GUTIERREZ on 16 1002


Ondansetron 8 Mg Tab.rapdis, 8 MG PO Q6H PRN for NAUSEA


   Prescribed by: RONDA GUTIERREZ on 16 1002


Propylene Glycol 10 Ml Drops, 1 DROP OU Q4H PRN for DRY EYES, (Reported)


Vitamin B Complex 1 Each Capsule, 1 CAP PO DAILY, (Reported)





Patient Home Medication List


Home Medication List Reviewed:  Yes





Review of Systems


Constitutional:  No chills, No fever


EENTM:  No ear discharge, No ear pain


Respiratory:  No cough, No short of breath


Cardiovascular:  No chest pain, No edema, No Hx of Intervention


Gastrointestinal:  No abdominal pain, No nausea


Genitourinary:  No discharge, No dysuria


Pregnant:  No


Birth Control/STD Prophylaxis:  None


Musculoskeletal:  see HPI; No back pain; joint pain





All Other Systems Reviewed


Negative Unless Noted:  Yes





Past Medical-Social-Family Hx


Patient Social History


Alcohol Use:  Rarely Uses


Smoking Status:  Never a Smoker


Recent Infectious Disease Expo:  No


Recent Hopitalizations:  No





Immunizations Up To Date


Tetanus Booster (TDap):  Unknown


Date of Pneumonia Vaccine:  2013


Date of Influenza Vaccine:  Oct 17, 2016





Seasonal Allergies


Seasonal Allergies:  Yes (Receives allergy injections)





Past Medical History


Surgeries:  Yes (TONSILLECTOMY 1948   HYSTERECTOMY )


Appendectomy


Respiratory:  Yes (HX OF SEVERAL PNEUMONIA'S   )


Pneumonia


Currently Using CPAP:  No


Currently Using BIPAP:  No


Cardiac:  No


Neurological:  No


Reproductive Disorders:  No


Genitourinary:  No


Gastrointestinal:  No


Musculoskeletal:  Yes (JOINT INJECTION)


Arthritis


Endocrine:  Yes


Hypothyroidsim


Cancer:  No


Psychosocial:  No


Integumentary:  No


Blood Disorders:  No





Family Medical History





Neoplasm


  17 MOTHER (BRAIN TUMOR)


Parkinson's disease


  19 FATHER


  G8 SISTER





Physical Exam


Vital Signs





Vital Signs - First Documented








 21





 10:32


 


Pulse 90


 


Resp 16


 


B/P (MAP) 136/82 (100)


 


Pulse Ox 97


 


O2 Delivery Room Air





Capillary Refill : Less Than 3 Seconds


Height, Weight, BMI


Height: 5'3.00"


Weight: 131lbs. 0.0oz. 59.620089tm; 21.00 BMI


Method:Stated


General Appearance:  WD/WN, no apparent distress


HEENT:  PERRL/EOMI, pharynx normal


Neck:  full range of motion, normal inspection


Cardiovascular:  normal peripheral pulses, regular rate, rhythm


Respiratory:  no respiratory distress, no accessory muscle use


Knees:  right knee non-tender, right knee normal inspection, right knee normal 

range of motion, right knee no evidence of injury; left knee pain, left knee 

soft tissue tenderness, left knee swelling





Procedures/Interventions





   Additional Procedures:  Arthrocentesis Aspirating


Progress


Left knee using proper sterile technique we cleaned the skin using alcohol and 

then iodine.  We allowed the iodine to dry for at least 2 minutes.  Then we used

a 25-gauge 1-1/2 inch needle and accessed using a medial anterior approach.  

Were unable to aspirate on 2 attempts so we switched to a 23-gauge 1-1/2 inch 

needle and were still unable to aspirate any fluid.  A sterile bandage was 

applied and patient tolerated procedure well.





Progress/Results/Core Measures


Results/Orders


Lab Results





Laboratory Tests








Test


 21


10:42 Range/Units


 


 


White Blood Count


 10.3 


 4.3-11.0


10^3/uL


 


Red Blood Count


 4.51 


 3.80-5.11


10^6/uL


 


Hemoglobin 14.5  11.5-16.0  g/dL


 


Hematocrit 43  35-52  %


 


Mean Corpuscular Volume 95  80-99  fL


 


Mean Corpuscular Hemoglobin 32  25-34  pg


 


Mean Corpuscular Hemoglobin


Concent 34 


 32-36  g/dL





 


Red Cell Distribution Width 12.4  10.0-14.5  %


 


Platelet Count


 230 


 130-400


10^3/uL


 


Mean Platelet Volume 11.0  9.0-12.2  fL


 


Immature Granulocyte % (Auto) 0   %


 


Neutrophils (%) (Auto) 81 H 42-75  %


 


Lymphocytes (%) (Auto) 12  12-44  %


 


Monocytes (%) (Auto) 6  0-12  %


 


Eosinophils (%) (Auto) 0  0-10  %


 


Basophils (%) (Auto) 0  0-10  %


 


Neutrophils # (Auto)


 8.4 H


 1.8-7.8


10^3/uL


 


Lymphocytes # (Auto)


 1.3 


 1.0-4.0


10^3/uL


 


Monocytes # (Auto)


 0.6 


 0.0-1.0


10^3/uL


 


Eosinophils # (Auto)


 0.0 


 0.0-0.3


10^3/uL


 


Basophils # (Auto)


 0.0 


 0.0-0.1


10^3/uL


 


Immature Granulocyte # (Auto)


 0.0 


 0.0-0.1


10^3/uL


 


Sodium Level 129 L 135-145  MMOL/L


 


Potassium Level 4.2  3.6-5.0  MMOL/L


 


Chloride Level 95 L   MMOL/L


 


Carbon Dioxide Level 24  21-32  MMOL/L


 


Anion Gap 10  5-14  MMOL/L


 


Blood Urea Nitrogen 8  7-18  MG/DL


 


Creatinine


 0.80 


 0.60-1.30


MG/DL


 


Estimat Glomerular Filtration


Rate > 60 


  





 


BUN/Creatinine Ratio 10   


 


Glucose Level 122 H   MG/DL


 


Calcium Level 9.3  8.5-10.1  MG/DL


 


Corrected Calcium 8.9  8.5-10.1  MG/DL


 


Total Bilirubin 0.7  0.1-1.0  MG/DL


 


Aspartate Amino Transf


(AST/SGOT) 30 


 5-34  U/L





 


Alanine Aminotransferase


(ALT/SGPT) 23 


 0-55  U/L





 


Alkaline Phosphatase 84    U/L


 


C-Reactive Protein High


Sensitivity 4.05 H


 0.00-0.50


MG/DL


 


Total Protein 7.7  6.4-8.2  GM/DL


 


Albumin 4.5  3.2-4.5  GM/DL








My Orders





Orders - TEMI WILKINSON


Blood Culture (21 11:13)


Cbc With Automated Diff (21 11:13)


Hs C Reactive Protein (21 11:13)


Comprehensive Metabolic Panel (21 11:13)


Hydrocodone/Apap 5/325 Tablet (Lortab 5 (21 11:30)


Knee, Left, 3 Views (21 12:40)





Medications Given in ED





Current Medications








 Medications  Dose


 Ordered  Sig/Odalys


 Route  Start Time


 Stop Time Status Last Admin


Dose Admin


 


 Acetaminophen/


 Hydrocodone Bitart  1 ea  ONCE  ONCE


 PO  21 11:30


 21 11:31 DC 21 11:47


1 EA








Vital Signs/I&O











 21





 10:32


 


Pulse 90


 


Resp 16


 


B/P (MAP) 136/82 (100)


 


Pulse Ox 97


 


O2 Delivery Room Air














Blood Pressure Mean:                    100











Progress


Progress Note #1:  


   Time:  11:18


Progress Note


Significant joint effusion.  Plan to tap her check for crystals, cell count and 

blood culture and fluid culture.  Labs including a CRP.  Hydrocodone for pain.


Progress Note #2:  


   Time:  12:43


Progress Note


After 3 attempts to aspirate any fluid we were successfully able to get into the

joint space but could not get any more than a single drop of serosanguineous 

fluid.  Patient feels that her pain is a little better after the hydrocodone.  

We will get a 3 view knee x-ray.  CRP is 4.  ESR is not available since the 

machine is broken.  White count is normal.  The leg does not appear 

erythematous, warm just having some mild left knee soft tissue swelling.  

Nonedematous.  Suspect this is related to arthritis.  She already received a 

dose of steroids so were going to give her an NSAID and have her follow-up with 

the orthopedic surgeon next week.  Return precautions were given





Diagnostic Imaging





   Diagonstic Imaging:  Xray


   Plain Films/CT/US/NM/MRI:  knee (Left)


Comments


NAME:   BRAN WANG 


MED REC#:   [id number]


ACCOUNT#:   [id number]


PT STATUS:   REG ER


:   1940


PHYSICIAN:   TEMI WILKINSON MD


ADMIT DATE:   21/ER


                                   ***Draft***


Date of Exam:21





KNEE, LEFT, 3 VIEWS








Indication: Left knee pain and swelling.





Comparison: None.





Discussion: Three views of the left knee were obtained.


Moderately advanced degenerative disease is noted affecting all 3


compartments. Small effusion. No fracture or dislocation. Soft


tissues are unremarkable. Alignment is anatomic.





Impression:


1. Moderately advanced left knee degenerative disease. No acute


abnormality.





  Dictated on workstation # [id number]








Dict:   21 1315


Trans:   21 1346


[facility] [id number]





Interpreted by:     SIMONE IZAGUIRRE MD


Electronically signed by:


   Reviewed:  Reviewed by Me


Consults :  


   Consulting Physician:  LUIS F SANDHU MD


Consults Notes


Discussed the case and findings with Dr. Sandhu and that we were not able to 

collect any fluid from the synovial space.  He agrees that if it does not look 

septic and that he does not think it sounds septic so he would just see the 

patient next week in the clinic.





Departure


Impression





   Primary Impression:  


   Arthritis of left knee


Disposition:  01 HOME, SELF-CARE


Condition:  Stable





Departure-Patient Inst.


Decision time for Depature:  12:45


Referrals:  


[facility] [place] (PCP)


Primary Care Physician








GOLD MILLER (Family)


Primary Care Physician








LUIS F SANDHU MD


Patient Instructions:  Knee Pain (DC), Osteoarthritis (DC)





Add. Discharge Instructions:  


I suspect the swelling around your knee today is related to osteoarthritis.  I 

do not see any evidence of an infection.  We did gather some blood cultures 

which will grow out any bacteria over the next 2 days.


Tylenol 1000 mg every 8 hours as necessary for pain.


Mobic 10 mg daily for the next 2 weeks.  If you have stomach pain or chest pain 

related to the Mobic then stop taking it and contact your primary care doctor.


If you have severe breakthrough pain keeping you from being functional then you 

may take 1/2 to 1 tablet of hydrocodone every 6 hours as necessary.


Do not expect to be pain-free for the next 1 to 2 weeks.


Monday morning call Dr. Sandhu's office and request a follow-up appointment for 

reexamination of your knee.


Return to the ER promptly if you see redness going up your leg or you experience

fever or other worrisome symptoms.


All discharge instructions reviewed with patient and/or family. Voiced 

understanding.


Scripts


Hydrocodone/Acetaminophen (Hydrocodone-Acetamin 5-325 mg) 1 Each Tablet


1 TAB PO Q6H PRN for PAIN-MODERATE (5-7) for 7 Days, #12 TAB 0 Refills


   Prov: TEMI WILKINSON         21 


Meloxicam (Mobic) 15 Mg Tablet


15 MG PO DAILY for 14 Days, #14 TAB 0 Refills


   Prov: TEMI WILKINSON         21





Copy


Copies To 1:   LUIS F SANDHU MD, TITUS J                 2021 11:18 patient